# Patient Record
Sex: FEMALE | Race: WHITE | NOT HISPANIC OR LATINO
[De-identification: names, ages, dates, MRNs, and addresses within clinical notes are randomized per-mention and may not be internally consistent; named-entity substitution may affect disease eponyms.]

---

## 2022-11-22 PROBLEM — Z00.00 ENCOUNTER FOR PREVENTIVE HEALTH EXAMINATION: Status: ACTIVE | Noted: 2022-11-22

## 2022-11-23 ENCOUNTER — APPOINTMENT (OUTPATIENT)
Dept: OBGYN | Facility: CLINIC | Age: 32
End: 2022-11-23

## 2022-11-23 ENCOUNTER — NON-APPOINTMENT (OUTPATIENT)
Age: 32
End: 2022-11-23

## 2022-11-23 VITALS
WEIGHT: 138 LBS | BODY MASS INDEX: 25.4 KG/M2 | SYSTOLIC BLOOD PRESSURE: 103 MMHG | HEIGHT: 62 IN | DIASTOLIC BLOOD PRESSURE: 55 MMHG

## 2022-11-23 DIAGNOSIS — Z80.3 FAMILY HISTORY OF MALIGNANT NEOPLASM OF BREAST: ICD-10-CM

## 2022-11-23 DIAGNOSIS — Z01.419 ENCOUNTER FOR GYNECOLOGICAL EXAMINATION (GENERAL) (ROUTINE) W/OUT ABNORMAL FINDINGS: ICD-10-CM

## 2022-11-23 PROCEDURE — 99385 PREV VISIT NEW AGE 18-39: CPT

## 2022-11-23 NOTE — HISTORY OF PRESENT ILLNESS
[Y] : Patient is sexually active [N] : Patient denies prior pregnancies [Menarche Age: ____] : age at menarche was [unfilled] [No] : never pregnant [Currently Active] : currently active [Men] : men [FreeTextEntry1] : Patient presents for initial office visit.  [LMPDate] : 11/06/2022 [MensesFreq] : 28 [MensesLength] : 6-7

## 2022-11-23 NOTE — PLAN
[FreeTextEntry1] : annual: pap and hpv\par known hx of stage 4 endometriosis (surgery in [place] )\par was on ocp but stopped to TTC\par has not had luck TTC for about a year,\par issues discussed and pt sent to [facility] directly

## 2022-11-23 NOTE — PHYSICAL EXAM
[Chaperone Present] : A chaperone was present in the examining room during all aspects of the physical examination [Appropriately responsive] : appropriately responsive [Examination Of The Breasts] : a normal appearance [No Masses] : no breast masses were palpable [Labia Majora] : normal [Normal] : normal [Uterine Adnexae] : normal [FreeTextEntry4] : nodularity and pain felt in post cul de sac and uterosacrals

## 2022-11-29 LAB — HPV HIGH+LOW RISK DNA PNL CVX: NOT DETECTED

## 2022-12-05 LAB — CYTOLOGY CVX/VAG DOC THIN PREP: NORMAL

## 2023-05-22 RX ORDER — PRENATAL SUPPLEMENT WITH DHA 1100; 30; 1.6; 1.8; 15; 2.5; .012; 1; .15; 20; 25; 2; 1000; 200; 20; 29 [IU]/1; MG/1; MG/1; MG/1; MG/1; MG/1; MG/1; MG/1; MG/1; MG/1; MG/1; MG/1; [IU]/1; MG/1; [IU]/1; MG/1
29-1-200 CAPSULE, GELATIN COATED ORAL DAILY
Qty: 90 | Refills: 3 | Status: ACTIVE | COMMUNITY
Start: 2023-05-22 | End: 1900-01-01

## 2023-07-19 ENCOUNTER — APPOINTMENT (OUTPATIENT)
Dept: OBGYN | Facility: CLINIC | Age: 33
End: 2023-07-19
Payer: COMMERCIAL

## 2023-07-19 VITALS
WEIGHT: 135 LBS | OXYGEN SATURATION: 98 % | DIASTOLIC BLOOD PRESSURE: 66 MMHG | SYSTOLIC BLOOD PRESSURE: 102 MMHG | HEART RATE: 74 BPM | BODY MASS INDEX: 24.84 KG/M2 | HEIGHT: 62 IN

## 2023-07-19 DIAGNOSIS — N97.9 FEMALE INFERTILITY, UNSPECIFIED: ICD-10-CM

## 2023-07-19 DIAGNOSIS — Z87.42 PERSONAL HISTORY OF OTHER DISEASES OF THE FEMALE GENITAL TRACT: ICD-10-CM

## 2023-07-19 PROCEDURE — 99213 OFFICE O/P EST LOW 20 MIN: CPT

## 2023-07-21 ENCOUNTER — TRANSCRIPTION ENCOUNTER (OUTPATIENT)
Age: 33
End: 2023-07-21

## 2023-07-21 PROBLEM — Z87.42 HISTORY OF ENDOMETRIOSIS: Status: RESOLVED | Noted: 2022-11-23 | Resolved: 2023-07-21

## 2023-07-21 PROBLEM — N97.9 INFERTILITY, FEMALE, SECONDARY: Status: ACTIVE | Noted: 2023-07-21

## 2023-07-21 NOTE — PLAN
[FreeTextEntry1] : 1. endometriosis and pelvic pain: known stage 4 endometriosis s/p laparoscopy in [place]\par issues related discussed. TTC: I had sent her to [facility] see below\par \par 2. infertility: s/p 1 oocyte retrieval with [facility], only 6 eggs which was a low yield given her age of 32\par thought to be likely secondary to scarring and endometriosis\par \par 3. had delayed menses but then did get about a week late, pregnancy test negative

## 2023-08-28 ENCOUNTER — APPOINTMENT (OUTPATIENT)
Dept: OBGYN | Facility: CLINIC | Age: 33
End: 2023-08-28
Payer: COMMERCIAL

## 2023-08-28 VITALS
BODY MASS INDEX: 25.21 KG/M2 | OXYGEN SATURATION: 98 % | DIASTOLIC BLOOD PRESSURE: 78 MMHG | HEIGHT: 62 IN | SYSTOLIC BLOOD PRESSURE: 122 MMHG | HEART RATE: 99 BPM | WEIGHT: 137 LBS

## 2023-08-28 DIAGNOSIS — Z32.00 ENCOUNTER FOR PREGNANCY TEST, RESULT UNKNOWN: ICD-10-CM

## 2023-08-28 PROCEDURE — 76817 TRANSVAGINAL US OBSTETRIC: CPT

## 2023-08-28 PROCEDURE — 99214 OFFICE O/P EST MOD 30 MIN: CPT

## 2023-08-28 PROCEDURE — 81025 URINE PREGNANCY TEST: CPT

## 2023-08-28 NOTE — PHYSICAL EXAM
[Appropriately responsive] : appropriately responsive [Alert] : alert [No Acute Distress] : no acute distress [No Lymphadenopathy] : no lymphadenopathy [Soft] : soft [Non-tender] : non-tender [Non-distended] : non-distended [No HSM] : No HSM [No Lesions] : no lesions [No Mass] : no mass [Oriented x3] : oriented x3 [Labia Majora] : normal [Labia Minora] : normal [Normal] : normal [Uterine Adnexae] : normal [FreeTextEntry6] : gravid 7 wk

## 2023-08-28 NOTE — PLAN
[FreeTextEntry1] : pregnancy confirmed viable Over 50% of 30 min visit counseling and coordination of care.  *did have mild spotting, AB pos blood type as checked at [facility]  Discussed with patient options regarding genetic tests. Options for invasive tests like amniocentesis, CVS versus NIPT and NT scans discussed at length. Also discussed with patient option for genetic carrier screening tests (both standard 4 tests including Cystic Fibrosis and extended panels with multiple tests) *she did genetic panel at [facility], she was tested positive for famililial med fever,  negative, issues related discussed.  Reading material given.

## 2023-08-28 NOTE — HISTORY OF PRESENT ILLNESS
[FreeTextEntry1] : 34 yo patient presents for pregnancy care. Patient was going to start IVF process and conceived naturally.  IVF([facility]) performed ultrasound on 7/15/23 and 7/22/23 confirmed pregnancy LMP: 7/10/23 SABINE:

## 2023-08-28 NOTE — PROCEDURE
[Transvaginal OB Sonogram] : Transvaginal OB Sonogram [Intrauterine Pregnancy] : intrauterine pregnancy [Yolk Sac] : yolk sac present [Fetal Heart] : fetal heart present [CRL: ___ (mm)] : CRL - [unfilled]Umm [Current GA by Sonogram: ___ (wks)] : Current GA by Sonogram: [unfilled]Uwks [___ day(s)] : [unfilled] days [FreeTextEntry1] : edc today 4/12/24  by lmp 4/18/24 so explained may change to lmp dates

## 2023-09-11 ENCOUNTER — APPOINTMENT (OUTPATIENT)
Dept: OBGYN | Facility: CLINIC | Age: 33
End: 2023-09-11
Payer: COMMERCIAL

## 2023-09-11 VITALS
WEIGHT: 139 LBS | HEART RATE: 107 BPM | DIASTOLIC BLOOD PRESSURE: 70 MMHG | OXYGEN SATURATION: 97 % | BODY MASS INDEX: 25.42 KG/M2 | SYSTOLIC BLOOD PRESSURE: 113 MMHG

## 2023-09-11 DIAGNOSIS — O20.0 THREATENED ABORTION: ICD-10-CM

## 2023-09-11 DIAGNOSIS — N92.6 IRREGULAR MENSTRUATION, UNSPECIFIED: ICD-10-CM

## 2023-09-11 PROCEDURE — 99213 OFFICE O/P EST LOW 20 MIN: CPT

## 2023-09-11 PROCEDURE — 36415 COLL VENOUS BLD VENIPUNCTURE: CPT

## 2023-09-12 LAB
ABO + RH PNL BLD: NORMAL
BLD GP AB SCN SERPL QL: NORMAL
HBV SURFACE AG SER QL: NONREACTIVE
HCV AB SER QL: NONREACTIVE
HCV S/CO RATIO: 0.11 S/CO
HIV1+2 AB SPEC QL IA.RAPID: NONREACTIVE
RUBV IGG FLD-ACNC: 12.3 INDEX
RUBV IGG SER-IMP: POSITIVE
T PALLIDUM AB SER QL IA: NEGATIVE
T4 FREE SERPL-MCNC: 1.1 NG/DL
TSH SERPL-ACNC: 2.15 UIU/ML
VZV AB TITR SER: POSITIVE
VZV IGG SER IF-ACNC: 1085 INDEX

## 2023-09-14 LAB — BACTERIA UR CULT: NORMAL

## 2023-10-09 ENCOUNTER — ASOB RESULT (OUTPATIENT)
Age: 33
End: 2023-10-09

## 2023-10-09 ENCOUNTER — APPOINTMENT (OUTPATIENT)
Dept: ANTEPARTUM | Facility: CLINIC | Age: 33
End: 2023-10-09
Payer: COMMERCIAL

## 2023-10-09 ENCOUNTER — APPOINTMENT (OUTPATIENT)
Dept: OBGYN | Facility: CLINIC | Age: 33
End: 2023-10-09
Payer: COMMERCIAL

## 2023-10-09 VITALS
WEIGHT: 143 LBS | DIASTOLIC BLOOD PRESSURE: 69 MMHG | OXYGEN SATURATION: 99 % | SYSTOLIC BLOOD PRESSURE: 97 MMHG | HEART RATE: 85 BPM | BODY MASS INDEX: 26.16 KG/M2

## 2023-10-09 DIAGNOSIS — N80.9 ENDOMETRIOSIS, UNSPECIFIED: ICD-10-CM

## 2023-10-09 DIAGNOSIS — O09.00 SUPERVISION OF PREGNANCY WITH HISTORY OF INFERTILITY, UNSPECIFIED TRIMESTER: ICD-10-CM

## 2023-10-09 PROCEDURE — 93976 VASCULAR STUDY: CPT

## 2023-10-09 PROCEDURE — 0500F INITIAL PRENATAL CARE VISIT: CPT

## 2023-10-09 PROCEDURE — 81003 URINALYSIS AUTO W/O SCOPE: CPT | Mod: NC,QW

## 2023-10-09 PROCEDURE — 36415 COLL VENOUS BLD VENIPUNCTURE: CPT

## 2023-10-09 PROCEDURE — 76813 OB US NUCHAL MEAS 1 GEST: CPT

## 2023-10-09 RX ORDER — ONDANSETRON 4 MG/1
4 TABLET, ORALLY DISINTEGRATING ORAL
Qty: 20 | Refills: 1 | Status: ACTIVE | COMMUNITY
Start: 2023-10-09 | End: 1900-01-01

## 2023-10-09 RX ORDER — DOXYLAMINE SUCCINATE AND PYRIDOXINE HYDROCHLORIDE 10; 10 MG/1; MG/1
10-10 TABLET, DELAYED RELEASE ORAL
Qty: 90 | Refills: 3 | Status: ACTIVE | COMMUNITY
Start: 2023-10-09 | End: 1900-01-01

## 2023-10-15 PROBLEM — O09.00 SUPERVISION OF PREGNANCY WITH HISTORY OF INFERTILITY: Status: ACTIVE | Noted: 2023-10-15

## 2023-10-15 PROBLEM — N80.9 ENDOMETRIOSIS: Status: ACTIVE | Noted: 2023-07-21

## 2023-11-06 ENCOUNTER — APPOINTMENT (OUTPATIENT)
Dept: OBGYN | Facility: CLINIC | Age: 33
End: 2023-11-06
Payer: COMMERCIAL

## 2023-11-06 ENCOUNTER — NON-APPOINTMENT (OUTPATIENT)
Age: 33
End: 2023-11-06

## 2023-11-06 VITALS
DIASTOLIC BLOOD PRESSURE: 73 MMHG | HEART RATE: 95 BPM | SYSTOLIC BLOOD PRESSURE: 106 MMHG | BODY MASS INDEX: 26.7 KG/M2 | WEIGHT: 146 LBS | OXYGEN SATURATION: 99 %

## 2023-11-06 DIAGNOSIS — Z13.79 ENCOUNTER FOR OTHER SCREENING FOR GENETIC AND CHROMOSOMAL ANOMALIES: ICD-10-CM

## 2023-11-06 PROCEDURE — G0008: CPT

## 2023-11-06 PROCEDURE — 90686 IIV4 VACC NO PRSV 0.5 ML IM: CPT

## 2023-11-06 PROCEDURE — 0502F SUBSEQUENT PRENATAL CARE: CPT

## 2023-11-06 RX ORDER — METOCLOPRAMIDE 5 MG/1
5 TABLET ORAL 4 TIMES DAILY
Qty: 120 | Refills: 0 | Status: ACTIVE | COMMUNITY
Start: 2023-11-06 | End: 1900-01-01

## 2023-11-12 LAB
AFP MOM: 0.87
AFP VALUE: 35.8 NG/ML
ALPHA FETOPROTEIN SERUM COMMENT: NORMAL
ALPHA FETOPROTEIN SERUM INTERPRETATION: NORMAL
ALPHA FETOPROTEIN SERUM RESULTS: NORMAL
ALPHA FETOPROTEIN SERUM TEST RESULTS: NORMAL
GESTATIONAL AGE BASED ON: NORMAL
GESTATIONAL AGE ON COLLECTION DATE: 17.4 WEEKS
INSULIN DEP DIABETES: NO
MATERNAL AGE AT EDD AFP: 31.6 YR
MULTIPLE GESTATION: NO
OSBR RISK 1 IN: NORMAL
RACE: NORMAL
WEIGHT AFP: 146 LBS

## 2023-11-28 ENCOUNTER — ASOB RESULT (OUTPATIENT)
Age: 33
End: 2023-11-28

## 2023-11-28 ENCOUNTER — APPOINTMENT (OUTPATIENT)
Dept: ANTEPARTUM | Facility: CLINIC | Age: 33
End: 2023-11-28
Payer: COMMERCIAL

## 2023-11-28 PROCEDURE — 76817 TRANSVAGINAL US OBSTETRIC: CPT

## 2023-11-28 PROCEDURE — 76811 OB US DETAILED SNGL FETUS: CPT

## 2023-12-01 ENCOUNTER — NON-APPOINTMENT (OUTPATIENT)
Age: 33
End: 2023-12-01

## 2023-12-05 ENCOUNTER — APPOINTMENT (OUTPATIENT)
Dept: OBGYN | Facility: CLINIC | Age: 33
End: 2023-12-05
Payer: COMMERCIAL

## 2023-12-05 VITALS
BODY MASS INDEX: 28.35 KG/M2 | WEIGHT: 155 LBS | SYSTOLIC BLOOD PRESSURE: 106 MMHG | DIASTOLIC BLOOD PRESSURE: 73 MMHG | OXYGEN SATURATION: 100 %

## 2023-12-05 DIAGNOSIS — B00.1 HERPESVIRAL VESICULAR DERMATITIS: ICD-10-CM

## 2023-12-05 PROCEDURE — 0502F SUBSEQUENT PRENATAL CARE: CPT

## 2023-12-05 RX ORDER — VALACYCLOVIR 1 G/1
1 TABLET, FILM COATED ORAL DAILY
Qty: 30 | Refills: 3 | Status: ACTIVE | COMMUNITY
Start: 2023-12-05 | End: 1900-01-01

## 2023-12-15 ENCOUNTER — APPOINTMENT (OUTPATIENT)
Dept: ANTEPARTUM | Facility: CLINIC | Age: 33
End: 2023-12-15
Payer: COMMERCIAL

## 2023-12-15 ENCOUNTER — ASOB RESULT (OUTPATIENT)
Age: 33
End: 2023-12-15

## 2023-12-15 PROCEDURE — 76817 TRANSVAGINAL US OBSTETRIC: CPT

## 2023-12-15 PROCEDURE — 76816 OB US FOLLOW-UP PER FETUS: CPT

## 2023-12-26 ENCOUNTER — APPOINTMENT (OUTPATIENT)
Dept: OBGYN | Facility: CLINIC | Age: 33
End: 2023-12-26
Payer: COMMERCIAL

## 2023-12-26 VITALS
HEART RATE: 109 BPM | DIASTOLIC BLOOD PRESSURE: 59 MMHG | SYSTOLIC BLOOD PRESSURE: 101 MMHG | WEIGHT: 155 LBS | BODY MASS INDEX: 28.35 KG/M2 | OXYGEN SATURATION: 97 %

## 2023-12-26 DIAGNOSIS — Z13.1 ENCOUNTER FOR SCREENING FOR DIABETES MELLITUS: ICD-10-CM

## 2023-12-26 PROCEDURE — 81003 URINALYSIS AUTO W/O SCOPE: CPT | Mod: QW

## 2023-12-26 PROCEDURE — 0502F SUBSEQUENT PRENATAL CARE: CPT

## 2023-12-26 PROCEDURE — 36415 COLL VENOUS BLD VENIPUNCTURE: CPT

## 2024-01-08 LAB
BASOPHILS # BLD AUTO: 0.04 K/UL
BASOPHILS NFR BLD AUTO: 0.4 %
EOSINOPHIL # BLD AUTO: 0.12 K/UL
EOSINOPHIL NFR BLD AUTO: 1.2 %
GLUCOSE 1H P 50 G GLC PO SERPL-MCNC: 124 MG/DL
HCT VFR BLD CALC: 38.1 %
HGB BLD-MCNC: 12.2 G/DL
IMM GRANULOCYTES NFR BLD AUTO: 0.7 %
LYMPHOCYTES # BLD AUTO: 1.35 K/UL
LYMPHOCYTES NFR BLD AUTO: 13.7 %
MAN DIFF?: NORMAL
MCHC RBC-ENTMCNC: 30.3 PG
MCHC RBC-ENTMCNC: 32 GM/DL
MCV RBC AUTO: 94.5 FL
MONOCYTES # BLD AUTO: 0.82 K/UL
MONOCYTES NFR BLD AUTO: 8.3 %
NEUTROPHILS # BLD AUTO: 7.44 K/UL
NEUTROPHILS NFR BLD AUTO: 75.7 %
PLATELET # BLD AUTO: 141 K/UL
RBC # BLD: 4.03 M/UL
RBC # FLD: 14.1 %
T PALLIDUM AB SER QL IA: NEGATIVE
WBC # FLD AUTO: 9.84 K/UL

## 2024-01-24 ENCOUNTER — NON-APPOINTMENT (OUTPATIENT)
Age: 34
End: 2024-01-24

## 2024-01-30 ENCOUNTER — APPOINTMENT (OUTPATIENT)
Dept: OBGYN | Facility: CLINIC | Age: 34
End: 2024-01-30
Payer: COMMERCIAL

## 2024-01-30 VITALS
DIASTOLIC BLOOD PRESSURE: 76 MMHG | BODY MASS INDEX: 29.81 KG/M2 | HEART RATE: 93 BPM | WEIGHT: 163 LBS | OXYGEN SATURATION: 97 % | SYSTOLIC BLOOD PRESSURE: 111 MMHG

## 2024-01-30 DIAGNOSIS — Z23 ENCOUNTER FOR IMMUNIZATION: ICD-10-CM

## 2024-01-30 PROCEDURE — 90715 TDAP VACCINE 7 YRS/> IM: CPT

## 2024-01-30 PROCEDURE — 0502F SUBSEQUENT PRENATAL CARE: CPT

## 2024-01-30 PROCEDURE — 81003 URINALYSIS AUTO W/O SCOPE: CPT | Mod: QW

## 2024-01-30 PROCEDURE — 90471 IMMUNIZATION ADMIN: CPT

## 2024-02-22 ENCOUNTER — ASOB RESULT (OUTPATIENT)
Age: 34
End: 2024-02-22

## 2024-02-22 ENCOUNTER — APPOINTMENT (OUTPATIENT)
Dept: ANTEPARTUM | Facility: CLINIC | Age: 34
End: 2024-02-22
Payer: COMMERCIAL

## 2024-02-22 PROCEDURE — 76819 FETAL BIOPHYS PROFIL W/O NST: CPT

## 2024-02-22 PROCEDURE — 76820 UMBILICAL ARTERY ECHO: CPT | Mod: 59

## 2024-02-22 PROCEDURE — 76816 OB US FOLLOW-UP PER FETUS: CPT

## 2024-02-23 ENCOUNTER — APPOINTMENT (OUTPATIENT)
Dept: OBGYN | Facility: CLINIC | Age: 34
End: 2024-02-23
Payer: COMMERCIAL

## 2024-02-23 VITALS
HEART RATE: 127 BPM | BODY MASS INDEX: 30.55 KG/M2 | DIASTOLIC BLOOD PRESSURE: 70 MMHG | HEIGHT: 62 IN | WEIGHT: 166 LBS | SYSTOLIC BLOOD PRESSURE: 107 MMHG | OXYGEN SATURATION: 98 %

## 2024-02-23 PROCEDURE — 81003 URINALYSIS AUTO W/O SCOPE: CPT | Mod: NC,QW

## 2024-02-23 PROCEDURE — 0502F SUBSEQUENT PRENATAL CARE: CPT

## 2024-03-02 PROBLEM — Z23 ENCOUNTER FOR IMMUNIZATION: Status: ACTIVE | Noted: 2023-11-06

## 2024-03-13 ENCOUNTER — APPOINTMENT (OUTPATIENT)
Dept: OBGYN | Facility: CLINIC | Age: 34
End: 2024-03-13
Payer: COMMERCIAL

## 2024-03-13 VITALS
BODY MASS INDEX: 31.28 KG/M2 | DIASTOLIC BLOOD PRESSURE: 76 MMHG | SYSTOLIC BLOOD PRESSURE: 112 MMHG | HEIGHT: 62 IN | WEIGHT: 170 LBS | HEART RATE: 102 BPM | OXYGEN SATURATION: 100 %

## 2024-03-13 PROCEDURE — 0502F SUBSEQUENT PRENATAL CARE: CPT

## 2024-03-13 PROCEDURE — 81003 URINALYSIS AUTO W/O SCOPE: CPT | Mod: NC,QW

## 2024-03-14 ENCOUNTER — ASOB RESULT (OUTPATIENT)
Age: 34
End: 2024-03-14

## 2024-03-14 ENCOUNTER — APPOINTMENT (OUTPATIENT)
Dept: ANTEPARTUM | Facility: CLINIC | Age: 34
End: 2024-03-14
Payer: COMMERCIAL

## 2024-03-14 PROCEDURE — 76816 OB US FOLLOW-UP PER FETUS: CPT

## 2024-03-14 PROCEDURE — 76819 FETAL BIOPHYS PROFIL W/O NST: CPT

## 2024-03-14 PROCEDURE — 76820 UMBILICAL ARTERY ECHO: CPT | Mod: 59

## 2024-03-22 ENCOUNTER — APPOINTMENT (OUTPATIENT)
Dept: OBGYN | Facility: CLINIC | Age: 34
End: 2024-03-22
Payer: COMMERCIAL

## 2024-03-22 VITALS
OXYGEN SATURATION: 99 % | SYSTOLIC BLOOD PRESSURE: 112 MMHG | WEIGHT: 174 LBS | BODY MASS INDEX: 31.83 KG/M2 | DIASTOLIC BLOOD PRESSURE: 70 MMHG

## 2024-03-22 PROCEDURE — 0502F SUBSEQUENT PRENATAL CARE: CPT

## 2024-03-28 LAB
B-HEM STREP SPEC QL CULT: ABNORMAL
BASOPHILS # BLD AUTO: 0.05 K/UL
BASOPHILS NFR BLD AUTO: 0.4 %
EOSINOPHIL # BLD AUTO: 0.1 K/UL
EOSINOPHIL NFR BLD AUTO: 0.9 %
HBV SURFACE AG SER QL: NONREACTIVE
HCT VFR BLD CALC: 37.3 %
HGB BLD-MCNC: 11.9 G/DL
HIV1+2 AB SPEC QL IA.RAPID: NONREACTIVE
IMM GRANULOCYTES NFR BLD AUTO: 0.7 %
LYMPHOCYTES # BLD AUTO: 1.65 K/UL
LYMPHOCYTES NFR BLD AUTO: 14.1 %
MAN DIFF?: NORMAL
MCHC RBC-ENTMCNC: 28 PG
MCHC RBC-ENTMCNC: 31.9 GM/DL
MCV RBC AUTO: 87.8 FL
MONOCYTES # BLD AUTO: 0.99 K/UL
MONOCYTES NFR BLD AUTO: 8.5 %
NEUTROPHILS # BLD AUTO: 8.83 K/UL
NEUTROPHILS NFR BLD AUTO: 75.4 %
PLATELET # BLD AUTO: 149 K/UL
RBC # BLD: 4.25 M/UL
RBC # FLD: 13.8 %
WBC # FLD AUTO: 11.7 K/UL

## 2024-04-01 ENCOUNTER — NON-APPOINTMENT (OUTPATIENT)
Age: 34
End: 2024-04-01

## 2024-04-02 ENCOUNTER — APPOINTMENT (OUTPATIENT)
Dept: OBGYN | Facility: CLINIC | Age: 34
End: 2024-04-02
Payer: COMMERCIAL

## 2024-04-02 VITALS
BODY MASS INDEX: 32.74 KG/M2 | WEIGHT: 179 LBS | HEART RATE: 109 BPM | OXYGEN SATURATION: 98 % | DIASTOLIC BLOOD PRESSURE: 81 MMHG | SYSTOLIC BLOOD PRESSURE: 121 MMHG

## 2024-04-02 DIAGNOSIS — L29.9 PRURITUS, UNSPECIFIED: ICD-10-CM

## 2024-04-02 PROCEDURE — 0502F SUBSEQUENT PRENATAL CARE: CPT

## 2024-04-06 ENCOUNTER — OUTPATIENT (OUTPATIENT)
Dept: OUTPATIENT SERVICES | Facility: HOSPITAL | Age: 34
LOS: 1 days | Discharge: ANOTHER HOSPITAL | End: 2024-04-06
Payer: COMMERCIAL

## 2024-04-06 VITALS
RESPIRATION RATE: 16 BRPM | DIASTOLIC BLOOD PRESSURE: 53 MMHG | OXYGEN SATURATION: 98 % | SYSTOLIC BLOOD PRESSURE: 115 MMHG | TEMPERATURE: 99 F | HEART RATE: 97 BPM

## 2024-04-06 DIAGNOSIS — O26.899 OTHER SPECIFIED PREGNANCY RELATED CONDITIONS, UNSPECIFIED TRIMESTER: ICD-10-CM

## 2024-04-06 PROCEDURE — 83986 ASSAY PH BODY FLUID NOS: CPT

## 2024-04-06 PROCEDURE — 59025 FETAL NON-STRESS TEST: CPT

## 2024-04-06 PROCEDURE — 99214 OFFICE O/P EST MOD 30 MIN: CPT

## 2024-04-06 NOTE — OB PROVIDER TRIAGE NOTE - HISTORY OF PRESENT ILLNESS
32 yo  at 39w1d presenting for r/o ROM. Patient states that after voiding this evening at 23:30 she began to have continuous discharge that kept coming regardless of wiping. She endorses scant pink tinged discharged as well but no active bleeding, no  CTX. +Normal FM.    Ante: Normal nipt and anatomy, no high bps, passed gct    EFW 2700 ag 36wk, 3500 by [facility] today  GBS positive    GYN Stage IV endometriosis, denies fibroids/abnormal paps/HSV  OBHx     PMH endometriosis  PSH l/s endo excision  w/ endometriomas, nasal surgery   Meds PNV  NKDA

## 2024-04-06 NOTE — OB RN TRIAGE NOTE - NS_CONTACTNUMBEROFSUPPORTPERSON_OBGYN_ALL_OB_FT
LVM for call back. Offered 11-5-21 / 8-9:30am,  will complete paperwork when pt calls back. [phone]

## 2024-04-06 NOTE — OB PROVIDER TRIAGE NOTE - NSOBPROVIDERNOTE_OBGYN_ALL_OB_FT
34 yo  at 39w1d presenting for r/o ROM. Exam negative for ROM, XIAO normal, fetal status reassuring. Matneral BPs normal.    D/c to home. Return precautions discussed.    D/w Dr Palomares

## 2024-04-06 NOTE — OB PROVIDER TRIAGE NOTE - NSHPPHYSICALEXAM_GEN_ALL_CORE
Gen alert nad  abd soft nontender  taus cephalic, posterior placenta, bpp 8/8/ XIAO 9  SSE: Neg pooling, neg nitrazine, neg ferning  SVE FT/Long  EFM baseline 130/mod catarino/+accel/no decel. Reactive and reassuring.   Hettinger q 3 min

## 2024-04-08 ENCOUNTER — RESULT REVIEW (OUTPATIENT)
Age: 34
End: 2024-04-08

## 2024-04-08 DIAGNOSIS — Z03.71 ENCOUNTER FOR SUSPECTED PROBLEM WITH AMNIOTIC CAVITY AND MEMBRANE RULED OUT: ICD-10-CM

## 2024-04-08 DIAGNOSIS — Z3A.39 39 WEEKS GESTATION OF PREGNANCY: ICD-10-CM

## 2024-04-08 DIAGNOSIS — Z87.42 PERSONAL HISTORY OF OTHER DISEASES OF THE FEMALE GENITAL TRACT: ICD-10-CM

## 2024-04-09 ENCOUNTER — APPOINTMENT (OUTPATIENT)
Dept: OBGYN | Facility: CLINIC | Age: 34
End: 2024-04-09

## 2024-04-09 ENCOUNTER — TRANSCRIPTION ENCOUNTER (OUTPATIENT)
Age: 34
End: 2024-04-09

## 2024-04-09 ENCOUNTER — INPATIENT (INPATIENT)
Facility: HOSPITAL | Age: 34
LOS: 4 days | Discharge: HOME | DRG: 833 | End: 2024-04-14
Attending: OBSTETRICS & GYNECOLOGY | Admitting: OBSTETRICS & GYNECOLOGY
Payer: COMMERCIAL

## 2024-04-09 VITALS
RESPIRATION RATE: 18 BRPM | OXYGEN SATURATION: 99 % | HEART RATE: 98 BPM | SYSTOLIC BLOOD PRESSURE: 119 MMHG | TEMPERATURE: 98 F | DIASTOLIC BLOOD PRESSURE: 85 MMHG

## 2024-04-09 DIAGNOSIS — O26.899 OTHER SPECIFIED PREGNANCY RELATED CONDITIONS, UNSPECIFIED TRIMESTER: ICD-10-CM

## 2024-04-09 LAB
BASOPHILS # BLD AUTO: 0.06 K/UL — SIGNIFICANT CHANGE UP (ref 0–0.2)
BASOPHILS NFR BLD AUTO: 0.6 % — SIGNIFICANT CHANGE UP (ref 0–2)
BLD GP AB SCN SERPL QL: NEGATIVE — SIGNIFICANT CHANGE UP
EOSINOPHIL # BLD AUTO: 0.31 K/UL — SIGNIFICANT CHANGE UP (ref 0–0.5)
EOSINOPHIL NFR BLD AUTO: 3 % — SIGNIFICANT CHANGE UP (ref 0–6)
HCT VFR BLD CALC: 37.7 % — SIGNIFICANT CHANGE UP (ref 34.5–45)
HGB BLD-MCNC: 12.5 G/DL — SIGNIFICANT CHANGE UP (ref 11.5–15.5)
IMM GRANULOCYTES NFR BLD AUTO: 0.5 % — SIGNIFICANT CHANGE UP (ref 0–0.9)
LYMPHOCYTES # BLD AUTO: 1.89 K/UL — SIGNIFICANT CHANGE UP (ref 1–3.3)
LYMPHOCYTES # BLD AUTO: 18.3 % — SIGNIFICANT CHANGE UP (ref 13–44)
MCHC RBC-ENTMCNC: 28.5 PG — SIGNIFICANT CHANGE UP (ref 27–34)
MCHC RBC-ENTMCNC: 33.2 GM/DL — SIGNIFICANT CHANGE UP (ref 32–36)
MCV RBC AUTO: 85.9 FL — SIGNIFICANT CHANGE UP (ref 80–100)
MONOCYTES # BLD AUTO: 1.06 K/UL — HIGH (ref 0–0.9)
MONOCYTES NFR BLD AUTO: 10.3 % — SIGNIFICANT CHANGE UP (ref 2–14)
NEUTROPHILS # BLD AUTO: 6.93 K/UL — SIGNIFICANT CHANGE UP (ref 1.8–7.4)
NEUTROPHILS NFR BLD AUTO: 67.3 % — SIGNIFICANT CHANGE UP (ref 43–77)
NRBC # BLD: 0 /100 WBCS — SIGNIFICANT CHANGE UP (ref 0–0)
PLATELET # BLD AUTO: 137 K/UL — LOW (ref 150–400)
RBC # BLD: 4.39 M/UL — SIGNIFICANT CHANGE UP (ref 3.8–5.2)
RBC # FLD: 14.4 % — SIGNIFICANT CHANGE UP (ref 10.3–14.5)
RH IG SCN BLD-IMP: POSITIVE — SIGNIFICANT CHANGE UP
RH IG SCN BLD-IMP: POSITIVE — SIGNIFICANT CHANGE UP
T PALLIDUM AB TITR SER: NEGATIVE — SIGNIFICANT CHANGE UP
WBC # BLD: 10.3 K/UL — SIGNIFICANT CHANGE UP (ref 3.8–10.5)
WBC # FLD AUTO: 10.3 K/UL — SIGNIFICANT CHANGE UP (ref 3.8–10.5)

## 2024-04-09 PROCEDURE — 88307 TISSUE EXAM BY PATHOLOGIST: CPT | Mod: 26

## 2024-04-09 RX ORDER — FENTANYL/BUPIVACAINE/NS/PF 2MCG/ML-.1
250 PLASTIC BAG, INJECTION (ML) INJECTION
Refills: 0 | Status: DISCONTINUED | OUTPATIENT
Start: 2024-04-09 | End: 2024-04-09

## 2024-04-09 RX ORDER — AMPICILLIN TRIHYDRATE 250 MG
1 CAPSULE ORAL EVERY 4 HOURS
Refills: 0 | Status: DISCONTINUED | OUTPATIENT
Start: 2024-04-09 | End: 2024-04-10

## 2024-04-09 RX ORDER — DIPHENHYDRAMINE HCL 50 MG
25 CAPSULE ORAL ONCE
Refills: 0 | Status: COMPLETED | OUTPATIENT
Start: 2024-04-09 | End: 2024-04-09

## 2024-04-09 RX ORDER — OXYTOCIN 10 UNIT/ML
333.33 VIAL (ML) INJECTION
Qty: 20 | Refills: 0 | Status: DISCONTINUED | OUTPATIENT
Start: 2024-04-09 | End: 2024-04-10

## 2024-04-09 RX ORDER — OXYTOCIN 10 UNIT/ML
VIAL (ML) INJECTION
Qty: 30 | Refills: 0 | Status: DISCONTINUED | OUTPATIENT
Start: 2024-04-09 | End: 2024-04-10

## 2024-04-09 RX ORDER — CHLORHEXIDINE GLUCONATE 213 G/1000ML
1 SOLUTION TOPICAL DAILY
Refills: 0 | Status: DISCONTINUED | OUTPATIENT
Start: 2024-04-09 | End: 2024-04-10

## 2024-04-09 RX ORDER — SODIUM CHLORIDE 9 MG/ML
1000 INJECTION, SOLUTION INTRAVENOUS
Refills: 0 | Status: DISCONTINUED | OUTPATIENT
Start: 2024-04-09 | End: 2024-04-10

## 2024-04-09 RX ORDER — ONDANSETRON 8 MG/1
8 TABLET, FILM COATED ORAL ONCE
Refills: 0 | Status: DISCONTINUED | OUTPATIENT
Start: 2024-04-09 | End: 2024-04-14

## 2024-04-09 RX ORDER — AMPICILLIN TRIHYDRATE 250 MG
2 CAPSULE ORAL ONCE
Refills: 0 | Status: COMPLETED | OUTPATIENT
Start: 2024-04-09 | End: 2024-04-09

## 2024-04-09 RX ORDER — CITRIC ACID/SODIUM CITRATE 300-500 MG
15 SOLUTION, ORAL ORAL EVERY 6 HOURS
Refills: 0 | Status: DISCONTINUED | OUTPATIENT
Start: 2024-04-09 | End: 2024-04-10

## 2024-04-09 RX ADMIN — Medication 108 GRAM(S): at 18:30

## 2024-04-09 RX ADMIN — Medication 108 GRAM(S): at 14:42

## 2024-04-09 RX ADMIN — Medication 108 GRAM(S): at 10:42

## 2024-04-09 RX ADMIN — Medication 108 GRAM(S): at 22:05

## 2024-04-09 RX ADMIN — Medication 216 GRAM(S): at 06:30

## 2024-04-09 RX ADMIN — SODIUM CHLORIDE 125 MILLILITER(S): 9 INJECTION, SOLUTION INTRAVENOUS at 07:27

## 2024-04-09 RX ADMIN — Medication 2 MILLIUNIT(S)/MIN: at 07:30

## 2024-04-09 NOTE — OB RN DELIVERY SUMMARY - NSSELHIDDEN_OBGYN_ALL_OB_FT
[NS_DeliveryAttending1_OBGYN_ALL_OB_FT:[id number]],[NS_DeliveryRN_OBGYN_ALL_OB_FT:[id number]]

## 2024-04-09 NOTE — OB PROVIDER H&P - NSLOWPPHRISK_OBGYN_A_OB
No previous uterine incision/Steele Pregnancy/Less than or equal to 4 previous vaginal births/No known bleeding disorder/No history of postpartum hemorrhage/No other PPH risks indicated

## 2024-04-09 NOTE — OB PROVIDER LABOR PROGRESS NOTE - ASSESSMENT
P0 with SROM and labor now undergoing labor augmentation with pitocin, in the latent phase of labor  Will adjust toco to assist with pit titration, may consider IUPC if unable to more reliably monitor contractions  Fetal monitoring reassuring  Discussed labor progress and anticipated course  All questions answered

## 2024-04-09 NOTE — OB PROVIDER LABOR PROGRESS NOTE - ASSESSMENT
- Cat II FHT  - Pt 9cm dilated, will likely being pushing shortly  - Anticipate vaginal delivery  - Dr. Salinas in house

## 2024-04-09 NOTE — OB RN DELIVERY SUMMARY - NS_SEPSISRSKCALC_OBGYN_ALL_OB_FT
EOS calculated successfully. EOS Risk Factor: 0.5/1000 live births ([facility] national incidence); GA=39w5d; Temp=99.7; ROM=48.617; GBS='Positive'; Antibiotics='GBS specific antibiotics > 2 hrs prior to birth'

## 2024-04-09 NOTE — OB RN PATIENT PROFILE - FALL HARM RISK - HARM RISK INTERVENTIONS

## 2024-04-09 NOTE — OB PROVIDER H&P - HISTORY OF PRESENT ILLNESS
SANDY VARGAS is a 32yo  at 39+3 presenting for LOF and painful ctx. She reports gush of clear fluid at 0245 and painful ctx 10/10 requesting epidural ASAP. She denies vaginal bleeding, endorses FM.    Ante: Spontaneous conception. NIPT LR. Anatomy scan wnl. Passed GCT. Denies elevated BP. GBS+. EFW 3000g    OBHx: G1 current   GYNHx: stage IV endometriosis. She denies abnormal pap, STI, fibroids, ovarian cysts  PMHx: denies  meds: PNV  PSH: l/s endometriosis excision , nasal sinus surgery   allergies: NKDA    PE:  T(C): --  HR: --  BP: --  RR: --  SpO2: --  GEN: well-appearing, NAD  PULM: no increased WOB  ABD: soft, nontender, gravid  EXT: mild LE edema  TAUS: vertex  SVE: 50/-3    FHT: baseline 125, moderate variability, +accels, no decels  TOCO: ctx q2-3min  reactive & reassuring     A&P: 32yo  at 39+3 presenting with SROM 0245 in early labor, requesting epidural. Fetal status reassuring.   - Admit to L&D  - Consents signed  - PN reviewed, GBS+, ampicillin ordered   - NPO, IVF  - continuous tocometry, FHT   - epidural no w  - consider augmentation with pitocin     D/W Dr. Sanchez PGY3  D/W Dr. Juany VILLARREAL attending

## 2024-04-09 NOTE — OB PROVIDER LABOR PROGRESS NOTE - ASSESSMENT
Pt seen at bedside for prolonged decel. SVE anterior lip/ 0 station.    FHT: baseline 145, moderate variability, no accels, intermittent variable decels and prolonged 3-min decel with cintia to 100   TOCO: ctx 2-3min   cat II, overall reassuring with moderate variability    - pitocin paused  - pt repositioned to left lateral, will reposition to throne   - epidural in situ   - continue to monitor closely   - anticipate

## 2024-04-09 NOTE — OB RN PATIENT PROFILE - THE METHOD OF FEEDING WHEN THE NEWBORN REQUESTS AND CONTINUING EACH FEEDING SESSION UNTIL THE NEWBORN IS SATISFIED
Vasopressin has been stopped. Will continue to monitor closely.        08/06/18 0100   Vitals   Pulse 81   Heart Rate Source Monitor   Resp 19   /75   BP Location Left upper arm   Art Line   ABP (Arterial line BP) 114/60   ABP mean (Arterial line mean) 81 mmHg   Arterial Line Location Right radial   Art Line Wave Form Appropriate wave forms   Oxygen Therapy   SpO2 100 %   Pulse Oximeter Device Mode Continuous   Pulse Oximeter Device Location Finger   O2 Device Ventilator   FiO2  30 %   End Tidal CO2 26 (%)   Vent Information   PEEP/CPAP 5   Vt Ordered 450 mL   Rate Set 14 bmp   Peak Flow 60 L/min   Pressure Support 0 cmH20   Sensitivity 4 Statement Selected

## 2024-04-09 NOTE — OB PROVIDER LABOR PROGRESS NOTE - ASSESSMENT
Cat I tracing. Irregular contractions on 6mU pitocin.   - Plan to adjust toco  - Continue to titrate pitocin per protocol

## 2024-04-09 NOTE — OB PROVIDER LABOR PROGRESS NOTE - ASSESSMENT
- category 2   - Will reposition patient. Will conitnue to monitor closely.   - Pitocin currently at 4mu.

## 2024-04-09 NOTE — OB PROVIDER LABOR PROGRESS NOTE - ASSESSMENT
Pt seen at bedside for two consecutive prolonged 1-min decel. Pitocin paused. Patient repositioned. VE by Dr. Sanchez noted cervical swelling, 8cm dilated. FHR returned to baseline.     FHT : baseline 145, moderate variability, no accels, one variable decel then two consecutive prolonged late decels (each lasting 1min)   TOCO: ctx q2-3min  cat II     - pitocin paused for now - will restart once reactiive tracing   - reposition patient  - epidural in situ - topoff for analgesia   - continue to monitor closely   Dr. Salinas in-house

## 2024-04-09 NOTE — OB PROVIDER LABOR PROGRESS NOTE - ASSESSMENT
- category 1   - Patient now in active labor at 7cm dilated.   - Pitocin currently at 10mu. Will titrate as tolerated.

## 2024-04-09 NOTE — OB PROVIDER LABOR PROGRESS NOTE - ASSESSMENT
P0 progressing well in labor after presenting with SROM, now in the active phase  Fetal monitoring reassuring

## 2024-04-09 NOTE — OB PROVIDER LABOR PROGRESS NOTE - ASSESSMENT
- Cat I FHT  - Will position in [facility]  - Dr. Salinas in house  - Anticipate vaginal delivery

## 2024-04-09 NOTE — OB RN PATIENT PROFILE - FUNCTIONAL ASSESSMENT - DAILY ACTIVITY PT AGE POP HIDDEN
[facility] [address] [address]  [phone]          September 18, 2023    RE:  Thaddeus Vega                                                                                                                                                       [address] [facility] [place] [address]            To whom it may concern:    Thaddeus Vega is under my professional care for Closed fracture of right forearm, initial encounter He  may return to School with the following: The patient is ABLE to return to school.  No physical activity while in cast until MD follow up and re-evaluation       Sincerely,        Linda Mendez PA-C     Adult

## 2024-04-10 PROCEDURE — 59510 CESAREAN DELIVERY: CPT

## 2024-04-10 DEVICE — ARISTA 3GR: Type: IMPLANTABLE DEVICE | Status: FUNCTIONAL

## 2024-04-10 RX ORDER — AZITHROMYCIN 500 MG/1
500 TABLET, FILM COATED ORAL ONCE
Refills: 0 | Status: COMPLETED | OUTPATIENT
Start: 2024-04-10 | End: 2024-04-10

## 2024-04-10 RX ORDER — DIPHENHYDRAMINE HCL 50 MG
25 CAPSULE ORAL EVERY 6 HOURS
Refills: 0 | Status: DISCONTINUED | OUTPATIENT
Start: 2024-04-10 | End: 2024-04-14

## 2024-04-10 RX ORDER — SODIUM CHLORIDE 9 MG/ML
1000 INJECTION, SOLUTION INTRAVENOUS
Refills: 0 | Status: DISCONTINUED | OUTPATIENT
Start: 2024-04-10 | End: 2024-04-14

## 2024-04-10 RX ORDER — LANOLIN
1 OINTMENT (GRAM) TOPICAL EVERY 6 HOURS
Refills: 0 | Status: DISCONTINUED | OUTPATIENT
Start: 2024-04-10 | End: 2024-04-14

## 2024-04-10 RX ORDER — TETANUS TOXOID, REDUCED DIPHTHERIA TOXOID AND ACELLULAR PERTUSSIS VACCINE, ADSORBED 5; 2.5; 8; 8; 2.5 [IU]/.5ML; [IU]/.5ML; UG/.5ML; UG/.5ML; UG/.5ML
0.5 SUSPENSION INTRAMUSCULAR ONCE
Refills: 0 | Status: DISCONTINUED | OUTPATIENT
Start: 2024-04-10 | End: 2024-04-14

## 2024-04-10 RX ORDER — ACETAMINOPHEN 500 MG
975 TABLET ORAL
Refills: 0 | Status: DISCONTINUED | OUTPATIENT
Start: 2024-04-10 | End: 2024-04-14

## 2024-04-10 RX ORDER — KETOROLAC TROMETHAMINE 30 MG/ML
30 SYRINGE (ML) INJECTION EVERY 6 HOURS
Refills: 0 | Status: DISCONTINUED | OUTPATIENT
Start: 2024-04-10 | End: 2024-04-11

## 2024-04-10 RX ORDER — TRANEXAMIC ACID 100 MG/ML
1000 INJECTION, SOLUTION INTRAVENOUS ONCE
Refills: 0 | Status: COMPLETED | OUTPATIENT
Start: 2024-04-10 | End: 2024-04-10

## 2024-04-10 RX ORDER — IBUPROFEN 200 MG
600 TABLET ORAL EVERY 6 HOURS
Refills: 0 | Status: COMPLETED | OUTPATIENT
Start: 2024-04-10 | End: 2025-03-09

## 2024-04-10 RX ORDER — SIMETHICONE 80 MG/1
80 TABLET, CHEWABLE ORAL EVERY 4 HOURS
Refills: 0 | Status: DISCONTINUED | OUTPATIENT
Start: 2024-04-10 | End: 2024-04-14

## 2024-04-10 RX ORDER — OXYCODONE HYDROCHLORIDE 5 MG/1
5 TABLET ORAL ONCE
Refills: 0 | Status: DISCONTINUED | OUTPATIENT
Start: 2024-04-10 | End: 2024-04-14

## 2024-04-10 RX ORDER — OXYCODONE HYDROCHLORIDE 5 MG/1
5 TABLET ORAL
Refills: 0 | Status: COMPLETED | OUTPATIENT
Start: 2024-04-10 | End: 2024-04-17

## 2024-04-10 RX ORDER — OXYTOCIN 10 UNIT/ML
333.33 VIAL (ML) INJECTION
Qty: 20 | Refills: 0 | Status: DISCONTINUED | OUTPATIENT
Start: 2024-04-10 | End: 2024-04-14

## 2024-04-10 RX ORDER — ENOXAPARIN SODIUM 100 MG/ML
40 INJECTION SUBCUTANEOUS EVERY 24 HOURS
Refills: 0 | Status: DISCONTINUED | OUTPATIENT
Start: 2024-04-10 | End: 2024-04-14

## 2024-04-10 RX ORDER — METOCLOPRAMIDE HCL 10 MG
10 TABLET ORAL ONCE
Refills: 0 | Status: COMPLETED | OUTPATIENT
Start: 2024-04-10 | End: 2024-04-10

## 2024-04-10 RX ORDER — MAGNESIUM HYDROXIDE 400 MG/1
30 TABLET, CHEWABLE ORAL
Refills: 0 | Status: DISCONTINUED | OUTPATIENT
Start: 2024-04-10 | End: 2024-04-14

## 2024-04-10 RX ADMIN — Medication 10 MILLIGRAM(S): at 03:34

## 2024-04-10 RX ADMIN — Medication 975 MILLIGRAM(S): at 20:56

## 2024-04-10 RX ADMIN — AZITHROMYCIN 255 MILLIGRAM(S): 500 TABLET, FILM COATED ORAL at 03:36

## 2024-04-10 RX ADMIN — ENOXAPARIN SODIUM 40 MILLIGRAM(S): 100 INJECTION SUBCUTANEOUS at 17:50

## 2024-04-10 RX ADMIN — Medication 0.2 MILLIGRAM(S): at 03:27

## 2024-04-10 RX ADMIN — Medication 30 MILLIGRAM(S): at 12:52

## 2024-04-10 RX ADMIN — TRANEXAMIC ACID 220 MILLIGRAM(S): 100 INJECTION, SOLUTION INTRAVENOUS at 03:28

## 2024-04-10 RX ADMIN — Medication 975 MILLIGRAM(S): at 09:05

## 2024-04-10 RX ADMIN — Medication 30 MILLIGRAM(S): at 05:00

## 2024-04-10 RX ADMIN — Medication 975 MILLIGRAM(S): at 14:59

## 2024-04-10 RX ADMIN — Medication 25 MILLIGRAM(S): at 00:20

## 2024-04-10 RX ADMIN — Medication 975 MILLIGRAM(S): at 22:05

## 2024-04-10 RX ADMIN — Medication 30 MILLIGRAM(S): at 17:51

## 2024-04-10 NOTE — OB PROVIDER DELIVERY SUMMARY - NSPROVIDERDELIVERYNOTE_OBGYN_ALL_OB_FT
Low transverse  for nonreassuring heart tracing while pushing. Fetus in cephalic presentation. Fluid clear. Nuchal x 1 reduced. Hand from below used to assist with delivery. Uterus closed in two layers in an imbricating fashion with vicryl. Rectus closed with vicryl, with one vertical mattress suture. Fascia closed with vicryl. Subcutaneous closed in one layer with plain gut. Skin closed with monocryl. EBL 1000. IVF 1200 . . Low transverse  for nonreassuring heart tracing while pushing. Fetus in cephalic presentation. Fluid clear. Nuchal x 1 reduced. Hand from below used to assist with delivery. Uterus closed in two layers in an imbricating fashion with vicryl. Thai placed over hysterotomy. Rectus closed with vicryl, with one vertical mattress suture. Fascia closed with vicryl. Subcutaneous closed in one layer with plain gut. Skin closed with monocryl. EBL 1000. IVF 1200 . .

## 2024-04-10 NOTE — OB PROVIDER LABOR PROGRESS NOTE - ASSESSMENT
Pt seen at bedside with Dr. Salinas. Per Dr. Sanchez's recent VE patient making cervical change from 8cm to now anterior lip, possibly reducible with pushing/ctx. Patient performed with several practice pushes with Dr. Salinas at bedside, cervical lip not reducing. Instructed patient that we will stop pushing until more cervical change is made. Given cervical change from prior exam will continue with labor augmentation at this time and reexamine in ~1hr.    Prior to pushing FHT: baseline 135, moderate variability, +accels, no decels  TOCO: ctx q2-4min  cat I     With pushing variable and late decels with ctx/pushes, cat II     - pitocin at 8mu, continue to uptitrate to increase changes of cervical change and   - epidural in situ   - continue to monitor closely   - hopeful for   Dr. Salinas in-house  Pt seen at bedside with Dr. Salinas. Per Dr. Sanchez's recent VE patient making cervical change from 8cm to now anterior lip, thought to be possibly reducible with pushing/ctx. Patient performed several practice pushes with Dr. Salinas at bedside, cervical lip not reducing. Instructed patient that we will stop pushing until more cervical change is made. Given cervical change from prior exam will continue with labor augmentation at this time and reexamine in ~1hr.    Prior to pushing FHT: baseline 135, moderate variability, +accels, no decels  TOCO: ctx q2-4min  cat I     With pushing variable and late decels with ctx/pushes, cat II     - pitocin at 8mu, continue to uptitrate to increase changes of cervical change and   - epidural in situ   - continue to monitor closely   - hopeful for   Dr. Salinas in-house  Pt seen at bedside with Dr. Salinas. Per Dr. Sanchez's recent VE patient making cervical change from 9cm to now anterior lip, thought to be possibly reducible with pushing/ctx. Patient performed several practice pushes with Dr. Salinas at bedside, cervical lip not reducing. Instructed patient that we will stop pushing until more cervical change is made. Given cervical change from prior exam will continue with labor augmentation at this time and reexamine in ~1hr.    Prior to pushing FHT: baseline 135, moderate variability, +accels, no decels  TOCO: ctx q2-4min  cat I     With pushing variable and late decels with ctx/pushes, cat II     - pitocin at 8mu, continue to uptitrate to increase changes of cervical change and   - epidural in situ   - continue to monitor closely   - hopeful for   Dr. Salinas in-house

## 2024-04-10 NOTE — OB NEONATOLOGY/PEDIATRICIAN DELIVERY SUMMARY - NSPEDSNEONOTESA_OBGYN_ALL_OB_FT
Primary C/S called for category 2 tracing at 39.4 wks GA. Maternal labs negative except for + GBS; mother was adequately treated with Ampicillin. ROM x 24 hrs. Nuchal cord x 1 noted at delivery. Infant cried after bulb suction and stimulation. APGARS9/9. EOS= <0.5

## 2024-04-10 NOTE — LACTATION INITIAL EVALUATION - LACTATION INTERVENTIONS
initiate/review safe skin-to-skin/initiate/review techniques for position and latch/post discharge community resources provided/review techniques to increase milk supply/review techniques to manage sore nipples/engorgement/reviewed components of an effective feeding and at least 8 effective feedings per day required/reviewed importance of monitoring infant diapers, the breastfeeding log, and minimum output each day/reviewed feeding on demand/by cue at least 8 times a day/reviewed indications of inadequate milk transfer that would require supplementation

## 2024-04-10 NOTE — OB PROVIDER DELIVERY SUMMARY - NSSELHIDDEN_OBGYN_ALL_OB_FT
[NS_DeliveryAttending1_OBGYN_ALL_OB_FT:[id number]],[NS_DeliveryRN_OBGYN_ALL_OB_FT:[id number]],[NS_DeliveryAssist1_OBGYN_ALL_OB_FT:[id number]]

## 2024-04-10 NOTE — OB PROVIDER LABOR PROGRESS NOTE - NS_OBIHICONTRACTIONPATTERNDETAILS_OBGYN_ALL_OB_FT
Irregular contractions on 6mU pitocin
morgan 3 in 10 minutes.
q3-5min <200MVU
Irregular, not reliably picking up
morgan 4 in 10 minutes.
morgan 4 in 10 minutes.
q2 min
4 contractions in 10 min
morgan 4 in 10 minutes.
morgan 2 in 10 minuts.
morgan 3 in 10 minutes
IUPC: >200MVU  Pitocin 10mU

## 2024-04-10 NOTE — OB PROVIDER LABOR PROGRESS NOTE - NS_OBIHIFHRDETAILS_OBGYN_ALL_OB_FT
baseline 120, moderate variability, +accels, intermittent late decels.
baseline 150, moderate variability, +accels, no decels.
FHT reviewed. Cat I tracing. Baseline 130bpm. Moderate variability. +accels, no decels noted.
baseline 130, moderate variability, +accels, no decels.
baseline 135, moderate variability, +accels, nod ecels.
baseline 140, moderate variability, +accels, no decels.
130bpm baseline, moderate variability, accelerations, 2 late decelerations both 1 minute long with cintia 100 with spontaneous return
Category 1
145bpm baseline, moderate variability, accelerations present, 2 late declerations at time of note writing
155bpm baseline, moderate variability, accelerations present, no decelerations
Baseline 140, moderate variability, + accels, occasional variable decels
baseline 120, moderate variability, +accels, no decels

## 2024-04-10 NOTE — OB PROVIDER LABOR PROGRESS NOTE - ASSESSMENT
Patient seen at bedside to discuss lack of cervical change and abnormal labor progression. On last VE by Dr. Sanchez cervix noted to be swollen and dilation 9cm from 9.5cm. Discussed with patient that in active phase of labor cervical change is usually more rapid, particular once 9+cm. Patient has been the same exam for approx 4hrs with inadequate contractions. Patient counseled that her labor progress at this point has become protracted and we will try to continue with augmentation e.g. increasing pitocin until adequate as tolerated by fetus, trying IV benadryl for cervical swelling, and repositioning with peanut ball to increase chances of progression and vaginal delivery. Expressed that we are hopeful for a vaginal delivery but concerned about the possibility of arrest of labor. In the case of no cervical change by ~0100 would likely consider this arrest of dilation and recommend C/S delivery. Patient and partner in understanding and agreement with plan. All questions answered. Patient seen at bedside to discuss lack of cervical change and abnormal labor progression. On last VE by Dr. Sanchez cervix noted to be swollen and dilation 9cm from 9.5cm. Discussed with patient that in active phase of labor cervical change is usually more rapid, particular once 9+cm. Patient has been the same exam for approx 4hrs with inadequate contractions. Patient counseled that her labor progress at this point has become protracted and we will try to continue with augmentation e.g. increasing pitocin until adequate as tolerated by fetus, trying IV benadryl for cervical swelling, and repositioning with peanut ball to increase chances of progression and vaginal delivery. Expressed that we are hopeful for a vaginal delivery but concerned about the possibility of arrest of labor. In the case of no cervical change by ~0100 would likely consider this arrest of dilation and recommend C/S delivery. Patient and partner in understanding and agreement with plan. All questions answered.    FHT: baseline 145, moderate variability, no accels, no decels  TOCO: ctx 2 in 10min, not adequate  cat I

## 2024-04-10 NOTE — OB RN INTRAOPERATIVE NOTE - NS_ANESTHESIASTART_OBGYN_ALL_OB_DT
Mom called regarding referral for Psychiatrist from Dr Du Marie, unable to get appointment scheduled, mom frustrated at this point asking for assistance regarding this issue 10-Apr-2024 03:09

## 2024-04-10 NOTE — OB PROVIDER LABOR PROGRESS NOTE - ASSESSMENT
Attending note late entry 2/2 patient care:    While pushing with patient repeat late decelerations with pushing with delayed response to baseline. After resusitation and recovery, without decelerations pushing was attempted again with prolonged decelerations, OB stat was called. Patient was verbally consented about risks including: infection, bleeding, blood transfusion, bowel/bladder injury/repair, possible  hysterectomy. Verbally agreed and taken to OR. Fetal heart beat in 150s, therefore, stat was deferred and patient was prepped in usual sterile fashion.

## 2024-04-10 NOTE — LACTATION INITIAL EVALUATION - NS LACT CON REASON FOR REQ
39.5 wk gestation baby, about 7 hrs old at this time. Placed the baby STS with the mother while I provided breastfeeding education and explained normal  behaviour and the milk production feedback system. Assisted with positioning in a cross cradle hold and taught latch strategies. Baby was able to latch deeply and is feeding well, rhythmically sucking between short pauses of rest. Mother to continue with STS when possible, room-in, and feed as per cues at least 8-12x/ day. To f/u as needed.

## 2024-04-10 NOTE — OB RN INTRAOPERATIVE NOTE - NSSELHIDDEN_OBGYN_ALL_OB_FT
[NS_DeliveryAttending1_OBGYN_ALL_OB_FT:[id number]],[NS_DeliveryRN_OBGYN_ALL_OB_FT:[id number]] [NS_DeliveryAttending1_OBGYN_ALL_OB_FT:[id number]],[NS_DeliveryRN_OBGYN_ALL_OB_FT:[id number]],[NS_DeliveryAssist1_OBGYN_ALL_OB_FT:[id number]]

## 2024-04-10 NOTE — OB PROVIDER LABOR PROGRESS NOTE - NSVAGINALEXAM_OBGYN_ALL_OB_DT
09-Apr-2024 12:55
09-Apr-2024 18:58
09-Apr-2024 20:38
09-Apr-2024 17:50
09-Apr-2024 09:46
09-Apr-2024 16:40
10-Apr-2024 01:47

## 2024-04-10 NOTE — OB PROVIDER LABOR PROGRESS NOTE - NS_SUBJECTIVE/OBJECTIVE_OBGYN_ALL_OB_FT
Assuming care of pt for night team. FHT reviewed. Baseline 140, mod variability, +accels, nonrecurrent variable decelerations
Note entry delayed 2/2 pt care. Pt seen at bedside at 0245 for repeat cervical exam, pt w/ anterior lip that can be easily reduced. Pt started pushing with contractions and had a prolonged late deceleration. Pt was repositioned with return to baseline. Pitocin paused. Dr. Salinas at bedside. Discussion w/ pt and partner at bedside regarding decelerations with pushing and that if the baby is unable to tolerate pushing will need to proceed with primary  section. Decision made to observe for a few contractions before pushing again. When pushing baby again had late decelerations and the decision was made to proceed with primary  section for arrest of descent and NRFHT. At this time pt had another deceleration without return to baseline. The decision was made to call OB STAT and  RR overhead ot expedite moving to OR. Pt moved urgently to OR where FH was found to be returned to baseline of 150s. Given NRFHT discussed proceeding with primary  section. Pt in agreement with plan and verbal consent obtained. See operative note for additional details.
Pt seen at bedside. Reports rectal pressure with contractions. FHT reviewed. Baseline 150, mod variability, -accels, rare variable decelerations. Currently cat I FHT. SVE 9/90/0
Incoming attending note:  Patient seen at bedside with nurse and partner. Patient states she is appreciating contractions but tolerable at this time. Patient endorsed increase rectal pressure since last exam. Patient also felt warm to touch, temp 100.1, discussed ROM >12hours and risk of intrauterine infection. If temperature or changes in fetal status might need to add additional antibiotics. All questions were asked and answered.
Patient reports feeling comfortable with the epidural
Assumed care of patient 8am  Delayed entry 2/2 patient care  History and hospital course reviewed  Patient seen and evaluated    She reports pain is well controlled with the epidural. Denies concerns.
Patient seen at bedside for examination.
Patient seen at bedside. Cervical check exams have been done by same examiner, and found to have change from 9cm to anterior lip. Multiple attempts to push and reduce lip were done on contractions but unable to be reduced at this time. Due to change of cervix will continue increasing pitocin 2/2 inadequate contractions and re-examine in 1 hour. Caput on fetal head noted. All questions were asked and answered.
Patient seen at bedside for examination.
Patient appreciating increased rectal pressure.

## 2024-04-11 LAB
BASOPHILS # BLD AUTO: 0.04 K/UL — SIGNIFICANT CHANGE UP (ref 0–0.2)
BASOPHILS NFR BLD AUTO: 0.3 % — SIGNIFICANT CHANGE UP (ref 0–2)
EOSINOPHIL # BLD AUTO: 0.27 K/UL — SIGNIFICANT CHANGE UP (ref 0–0.5)
EOSINOPHIL NFR BLD AUTO: 1.9 % — SIGNIFICANT CHANGE UP (ref 0–6)
HCT VFR BLD CALC: 29.4 % — LOW (ref 34.5–45)
HGB BLD-MCNC: 9.4 G/DL — LOW (ref 11.5–15.5)
IMM GRANULOCYTES NFR BLD AUTO: 0.7 % — SIGNIFICANT CHANGE UP (ref 0–0.9)
LYMPHOCYTES # BLD AUTO: 1.26 K/UL — SIGNIFICANT CHANGE UP (ref 1–3.3)
LYMPHOCYTES # BLD AUTO: 8.9 % — LOW (ref 13–44)
MCHC RBC-ENTMCNC: 28.5 PG — SIGNIFICANT CHANGE UP (ref 27–34)
MCHC RBC-ENTMCNC: 32 GM/DL — SIGNIFICANT CHANGE UP (ref 32–36)
MCV RBC AUTO: 89.1 FL — SIGNIFICANT CHANGE UP (ref 80–100)
MONOCYTES # BLD AUTO: 1.19 K/UL — HIGH (ref 0–0.9)
MONOCYTES NFR BLD AUTO: 8.4 % — SIGNIFICANT CHANGE UP (ref 2–14)
NEUTROPHILS # BLD AUTO: 11.34 K/UL — HIGH (ref 1.8–7.4)
NEUTROPHILS NFR BLD AUTO: 79.8 % — HIGH (ref 43–77)
NRBC # BLD: 0 /100 WBCS — SIGNIFICANT CHANGE UP (ref 0–0)
PLATELET # BLD AUTO: 101 K/UL — LOW (ref 150–400)
RBC # BLD: 3.3 M/UL — LOW (ref 3.8–5.2)
RBC # FLD: 14.7 % — HIGH (ref 10.3–14.5)
WBC # BLD: 14.2 K/UL — HIGH (ref 3.8–10.5)
WBC # FLD AUTO: 14.2 K/UL — HIGH (ref 3.8–10.5)

## 2024-04-11 RX ORDER — IBUPROFEN 200 MG
600 TABLET ORAL EVERY 6 HOURS
Refills: 0 | Status: DISCONTINUED | OUTPATIENT
Start: 2024-04-11 | End: 2024-04-14

## 2024-04-11 RX ORDER — OXYCODONE HYDROCHLORIDE 5 MG/1
5 TABLET ORAL
Refills: 0 | Status: DISCONTINUED | OUTPATIENT
Start: 2024-04-11 | End: 2024-04-14

## 2024-04-11 RX ADMIN — Medication 975 MILLIGRAM(S): at 02:06

## 2024-04-11 RX ADMIN — SIMETHICONE 80 MILLIGRAM(S): 80 TABLET, CHEWABLE ORAL at 15:38

## 2024-04-11 RX ADMIN — Medication 600 MILLIGRAM(S): at 18:48

## 2024-04-11 RX ADMIN — Medication 600 MILLIGRAM(S): at 06:35

## 2024-04-11 RX ADMIN — Medication 600 MILLIGRAM(S): at 12:09

## 2024-04-11 RX ADMIN — Medication 975 MILLIGRAM(S): at 09:11

## 2024-04-11 RX ADMIN — Medication 1 TABLET(S): at 12:08

## 2024-04-11 RX ADMIN — Medication 975 MILLIGRAM(S): at 15:37

## 2024-04-11 RX ADMIN — Medication 30 MILLIGRAM(S): at 00:00

## 2024-04-11 RX ADMIN — Medication 600 MILLIGRAM(S): at 23:51

## 2024-04-11 RX ADMIN — Medication 975 MILLIGRAM(S): at 22:40

## 2024-04-11 RX ADMIN — Medication 975 MILLIGRAM(S): at 16:07

## 2024-04-11 RX ADMIN — Medication 600 MILLIGRAM(S): at 12:39

## 2024-04-11 RX ADMIN — SIMETHICONE 80 MILLIGRAM(S): 80 TABLET, CHEWABLE ORAL at 21:44

## 2024-04-11 RX ADMIN — OXYCODONE HYDROCHLORIDE 5 MILLIGRAM(S): 5 TABLET ORAL at 15:38

## 2024-04-11 RX ADMIN — Medication 600 MILLIGRAM(S): at 18:18

## 2024-04-11 RX ADMIN — OXYCODONE HYDROCHLORIDE 5 MILLIGRAM(S): 5 TABLET ORAL at 16:08

## 2024-04-11 RX ADMIN — Medication 975 MILLIGRAM(S): at 21:44

## 2024-04-11 RX ADMIN — Medication 975 MILLIGRAM(S): at 09:41

## 2024-04-11 RX ADMIN — ENOXAPARIN SODIUM 40 MILLIGRAM(S): 100 INJECTION SUBCUTANEOUS at 15:37

## 2024-04-11 NOTE — PROGRESS NOTE ADULT - SUBJECTIVE AND OBJECTIVE BOX
Patient evaluated at bedside this morning, resting comfortable in bed.   She reports pain is well controlled with oral pain medications.   She denies headache, dizziness, chest pain, shortness of breath, vomiting or heavy vaginal bleeding.  She has been ambulating without assistance, voiding spontaneously, passing gas, tolerating regular diet.     Physical Exam:  Vital Signs Last 24 Hrs  T(C): 36.3 (11 Apr 2024 02:00), Max: 36.6 (10 Apr 2024 17:50)  T(F): 97.4 (11 Apr 2024 02:00), Max: 97.9 (10 Apr 2024 22:00)  HR: 77 (11 Apr 2024 02:00) (59 - 83)  BP: 99/64 (11 Apr 2024 02:00) (94/55 - 116/71)  BP(mean): --  RR: 16 (11 Apr 2024 02:00) (16 - 18)  SpO2: 97% (11 Apr 2024 02:00) (97% - 98%)    Parameters below as of 10 Apr 2024 17:50  Patient On (Oxygen Delivery Method): room air        GA: NAD, A+0 x 3  Pulm: no increased WOB  Abd: soft, nontender, mildly distended, no rebound or guarding, incision clean, dry and intact, uterus firm at midline, 1 fb below umbilicus  Extremities: mild lower extremity edema, no calf tenderness

## 2024-04-12 ENCOUNTER — TRANSCRIPTION ENCOUNTER (OUTPATIENT)
Age: 34
End: 2024-04-12

## 2024-04-12 RX ORDER — IBUPROFEN 200 MG
1 TABLET ORAL
Qty: 0 | Refills: 0 | DISCHARGE
Start: 2024-04-12

## 2024-04-12 RX ORDER — ACETAMINOPHEN 500 MG
3 TABLET ORAL
Qty: 0 | Refills: 0 | DISCHARGE
Start: 2024-04-12

## 2024-04-12 RX ADMIN — Medication 600 MILLIGRAM(S): at 06:12

## 2024-04-12 RX ADMIN — Medication 600 MILLIGRAM(S): at 19:11

## 2024-04-12 RX ADMIN — Medication 975 MILLIGRAM(S): at 08:45

## 2024-04-12 RX ADMIN — Medication 1 APPLICATION(S): at 06:12

## 2024-04-12 RX ADMIN — Medication 600 MILLIGRAM(S): at 23:57

## 2024-04-12 RX ADMIN — Medication 975 MILLIGRAM(S): at 15:48

## 2024-04-12 RX ADMIN — Medication 975 MILLIGRAM(S): at 22:17

## 2024-04-12 RX ADMIN — Medication 600 MILLIGRAM(S): at 00:50

## 2024-04-12 RX ADMIN — Medication 600 MILLIGRAM(S): at 12:00

## 2024-04-12 RX ADMIN — ENOXAPARIN SODIUM 40 MILLIGRAM(S): 100 INJECTION SUBCUTANEOUS at 16:20

## 2024-04-12 RX ADMIN — Medication 975 MILLIGRAM(S): at 23:10

## 2024-04-12 RX ADMIN — Medication 975 MILLIGRAM(S): at 15:18

## 2024-04-12 RX ADMIN — SIMETHICONE 80 MILLIGRAM(S): 80 TABLET, CHEWABLE ORAL at 06:12

## 2024-04-12 RX ADMIN — Medication 600 MILLIGRAM(S): at 18:11

## 2024-04-12 RX ADMIN — Medication 600 MILLIGRAM(S): at 12:30

## 2024-04-12 RX ADMIN — Medication 975 MILLIGRAM(S): at 09:15

## 2024-04-12 RX ADMIN — Medication 1 TABLET(S): at 16:36

## 2024-04-12 RX ADMIN — Medication 600 MILLIGRAM(S): at 07:00

## 2024-04-12 NOTE — DISCHARGE NOTE OB - PATIENT PORTAL LINK FT
You can access the FollowMyHealth Patient Portal offered by [facility] by registering at the following website: [email or web address]. By joining [employer]’s FollowMyHealth portal, you will also be able to view your health information using other applications (apps) compatible with our system.

## 2024-04-12 NOTE — DISCHARGE NOTE OB - CARE PROVIDER_API CALL
Farida Harrington  Obstetrics and Gynecology  [address]  [place] [address]  Phone: [phone]  Fax: [phone]  Follow Up Time:

## 2024-04-12 NOTE — PROGRESS NOTE ADULT - SUBJECTIVE AND OBJECTIVE BOX
Patient evaluated at bedside this morning, resting comfortable in bed.   She reports pain is well controlled with oral pain medications.   She denies headache, dizziness, chest pain, shortness of breath, vomiting or heavy vaginal bleeding.  She has been ambulating without assistance, voiding spontaneously, passing gas, tolerating regular diet.     Physical Exam:  Vital Signs Last 24 Hrs  T(C): 36.7 (12 Apr 2024 02:00), Max: 36.8 (11 Apr 2024 18:00)  T(F): 98 (12 Apr 2024 02:00), Max: 98.3 (11 Apr 2024 18:00)  HR: 82 (12 Apr 2024 02:00) (74 - 82)  BP: 95/59 (12 Apr 2024 02:00) (95/59 - 108/60)  BP(mean): --  RR: 18 (12 Apr 2024 02:00) (16 - 18)  SpO2: 96% (12 Apr 2024 02:00) (95% - 98%)    Parameters below as of 11 Apr 2024 14:00  Patient On (Oxygen Delivery Method): room air        GA: NAD, A+0 x 3  Pulm: no increased WOB  Abd: soft, nontender, mildly distended, no rebound or guarding, incision clean, dry and intact, uterus firm at midline, 2 fb below umbilicus  Extremities: mild lower extremity edema, no calf tenderness                             9.4    14.20 )-----------( 101      ( 11 Apr 2024 05:30 )             29.4                Patient evaluated at bedside this morning, resting comfortable in bed.   She reports pain is well controlled with oral pain medications.   She denies headache, dizziness, chest pain, shortness of breath, vomiting or heavy vaginal bleeding.  She has been ambulating without assistance, voiding spontaneously, passing gas, tolerating regular diet.     Physical Exam:  Vital Signs Last 24 Hrs  T(C): 36.7 (12 Apr 2024 02:00), Max: 36.8 (11 Apr 2024 18:00)  T(F): 98 (12 Apr 2024 02:00), Max: 98.3 (11 Apr 2024 18:00)  HR: 82 (12 Apr 2024 02:00) (74 - 82)  BP: 95/59 (12 Apr 2024 02:00) (95/59 - 108/60)  BP(mean): --  RR: 18 (12 Apr 2024 02:00) (16 - 18)  SpO2: 96% (12 Apr 2024 02:00) (95% - 98%)    Parameters below as of 11 Apr 2024 14:00  Patient On (Oxygen Delivery Method): room air        GA: NAD, A+0 x 3  Pulm: no increased WOB  Abd: soft, nontender, mildly distended, no rebound or guarding, incision clean, dry and intact, uterus firm at midline, 2 fb below umbilicus  Extremities: moderate nonpitting lower extremity edema, no calf tenderness                             9.4    14.20 )-----------( 101      ( 11 Apr 2024 05:30 )             29.4

## 2024-04-12 NOTE — DISCHARGE NOTE OB - CARE PLAN
1 Principal Discharge DX:	Single delivery by  section  Assessment and plan of treatment:	 delivery, meeting all postpartum milestones.  Please follow-up with your OB doctor within 2 weeks.  You can resume a regular diet at home and may continue your prenatal vitamins as directed.  Please place nothing in the vagina for 6 weeks (no tampons, sex, douching, tub baths, swimming pools, etc).  If you have severe headaches and/or vision changes, heavy bleeding, or chest pain, please call your provider or go to the nearest Emergency Department.  Please call your OB with any signs of symptoms of infection including fever > 100.4 degrees, severe pain, redness/warmth/drainage from the incision,  malodorous vaginal discharge or heavy bleeding requiring more than 1-2 pads/hour. Please refrain from heavy lifting. Avoid soaking/bathing, you may shower normally. Make sure your incision stays dry after shower, avoid creams and ointments.  You can take Motrin 600mg orally every 6 hours  and tyelnol 650mg every 6-8 hours for pain as needed. Take oxycodone 5mg every 6 hours for severe pain as needed.  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

## 2024-04-12 NOTE — DISCHARGE NOTE OB - HOSPITAL COURSE
Patient underwent an uncomplicated  delivery for arrest of dilation/NRFHT after presenting with SROM. Uncomplicated postpartum course. On day of discharge, patient is meeting all post op milestones and all vitals are stable.

## 2024-04-12 NOTE — DISCHARGE NOTE OB - NS MD DC FALL RISK RISK
For information on Fall & Injury Prevention, visit: [email or web address] OR  [email or web address] OR  https://www.cdc.gov/steadi/patient.html

## 2024-04-13 RX ADMIN — Medication 975 MILLIGRAM(S): at 22:26

## 2024-04-13 RX ADMIN — Medication 600 MILLIGRAM(S): at 18:53

## 2024-04-13 RX ADMIN — Medication 1300 MILLIGRAM(S): at 15:14

## 2024-04-13 RX ADMIN — Medication 600 MILLIGRAM(S): at 07:41

## 2024-04-13 RX ADMIN — Medication 600 MILLIGRAM(S): at 18:23

## 2024-04-13 RX ADMIN — Medication 600 MILLIGRAM(S): at 12:37

## 2024-04-13 RX ADMIN — Medication 975 MILLIGRAM(S): at 23:30

## 2024-04-13 RX ADMIN — ENOXAPARIN SODIUM 40 MILLIGRAM(S): 100 INJECTION SUBCUTANEOUS at 16:18

## 2024-04-13 RX ADMIN — Medication 975 MILLIGRAM(S): at 16:45

## 2024-04-13 RX ADMIN — Medication 600 MILLIGRAM(S): at 12:07

## 2024-04-13 RX ADMIN — Medication 975 MILLIGRAM(S): at 09:00

## 2024-04-13 RX ADMIN — Medication 975 MILLIGRAM(S): at 09:30

## 2024-04-13 RX ADMIN — Medication 975 MILLIGRAM(S): at 03:02

## 2024-04-13 RX ADMIN — Medication 1 TABLET(S): at 12:07

## 2024-04-13 NOTE — PROGRESS NOTE ADULT - SUBJECTIVE AND OBJECTIVE BOX
Patient evaluated at bedside this morning, resting comfortable in bed.   She reports pain is well controlled with oral pain medications.   She denies headache, dizziness, chest pain, shortness of breath, vomiting or heavy vaginal bleeding.  She has been ambulating without assistance, voiding spontaneously, passing gas, tolerating regular diet.     Physical Exam:  Vital Signs Last 24 Hrs  T(C): 36.7 (13 Apr 2024 06:04), Max: 36.8 (12 Apr 2024 09:20)  T(F): 98.1 (13 Apr 2024 06:04), Max: 98.3 (12 Apr 2024 09:20)  HR: 57 (13 Apr 2024 06:04) (57 - 78)  BP: 102/65 (13 Apr 2024 06:04) (102/65 - 105/70)  BP(mean): --  RR: 16 (13 Apr 2024 06:04) (16 - 16)  SpO2: 95% (13 Apr 2024 06:04) (95% - 95%)    Parameters below as of 12 Apr 2024 09:20  Patient On (Oxygen Delivery Method): room air        GA: NAD, A+0 x 3  Pulm: no increased WOB  Abd: soft, nontender, mildly distended, no rebound or guarding, incision clean, dry and intact, uterus firm at midline, 2 fb below umbilicus  Extremities: mild lower extremity edema, no calf tenderness

## 2024-04-13 NOTE — PROGRESS NOTE ADULT - ATTENDING COMMENTS
Agree with the above, patient seen and examined by me. S/p pLTCS, POD#3, AFVSS and meeting postop and postpartum milestones. D/c home today with close outpatient f/u in 1 week.    Sally Peguero MD

## 2024-04-14 VITALS
TEMPERATURE: 98 F | RESPIRATION RATE: 18 BRPM | SYSTOLIC BLOOD PRESSURE: 126 MMHG | OXYGEN SATURATION: 95 % | DIASTOLIC BLOOD PRESSURE: 79 MMHG | HEART RATE: 63 BPM

## 2024-04-14 PROCEDURE — 86901 BLOOD TYPING SEROLOGIC RH(D): CPT

## 2024-04-14 PROCEDURE — 86850 RBC ANTIBODY SCREEN: CPT

## 2024-04-14 PROCEDURE — 85025 COMPLETE CBC W/AUTO DIFF WBC: CPT

## 2024-04-14 PROCEDURE — 88307 TISSUE EXAM BY PATHOLOGIST: CPT

## 2024-04-14 PROCEDURE — 86900 BLOOD TYPING SEROLOGIC ABO: CPT

## 2024-04-14 PROCEDURE — C1889: CPT

## 2024-04-14 PROCEDURE — 86780 TREPONEMA PALLIDUM: CPT

## 2024-04-14 PROCEDURE — 36415 COLL VENOUS BLD VENIPUNCTURE: CPT

## 2024-04-14 RX ADMIN — Medication 1 TABLET(S): at 12:35

## 2024-04-14 RX ADMIN — Medication 975 MILLIGRAM(S): at 02:04

## 2024-04-14 RX ADMIN — SIMETHICONE 80 MILLIGRAM(S): 80 TABLET, CHEWABLE ORAL at 00:35

## 2024-04-14 RX ADMIN — Medication 600 MILLIGRAM(S): at 12:35

## 2024-04-14 RX ADMIN — Medication 600 MILLIGRAM(S): at 00:35

## 2024-04-14 RX ADMIN — Medication 600 MILLIGRAM(S): at 05:57

## 2024-04-14 RX ADMIN — Medication 975 MILLIGRAM(S): at 09:23

## 2024-04-14 NOTE — PROGRESS NOTE ADULT - ASSESSMENT
33y Female POD#4 s/p C/S for NRFHT, Uncomplicated                                     - Neuro/Pain: motrin atc, tylenol atc, oxy prn  - CV:  VS per routine  - Pulm: Encourage ISS & Ambulation  - GI: Advance as tolerated  - : Voiding spontaneously  - DVT ppx: SCDs, Lovenox 40mg QD  - Dispo: POD #4
A/P: 33y s/p  section for NRFHT while pushing, POD#2, stable  -  Pain: PO motrin q6hrs, tylenol q8hrs, oxycodone for severe pain PRN  -  Post-operatively labs: hemodynamically stable, no symptoms of anemia   -  GI: tolerating regular diet, passing gas  -  : s/p herrera , urinating without difficulty  -  DVT prophylaxis: encouraged increased ambulation, SCDs, SQL  -  Dispo: POD 3 or 4
A/P: 33y s/p  section for arrest of dilation and NRFHT, POD#3, stable  -  Pain: PO motrin q6hrs, tylenol q8hrs, oxycodone for severe pain PRN  -  Post-operatively labs: hemodynamically stable, no symptoms of anemia   -  GI: tolerating regular diet, passing gas  -  : s/p herrera , urinating without difficulty  -  DVT prophylaxis: encouraged increased ambulation, SCDs, SQL  -  Dispo: POD 3 or 4
A/P: 33y s/p  section, POD#1, stable  -  Pain: PO motrin q6hrs, tylenol q8hrs, oxycodone for severe pain PRN  -  Post-operatively labs: hemodynamically stable, no symptoms of anemia   -  GI: tolerating regular diet, passing gas  -  : s/p herrera , urinating without difficulty  -  DVT prophylaxis: encouraged increased ambulation, SCDs, SQL  -  Dispo: POD 3 or 4

## 2024-04-14 NOTE — PROGRESS NOTE ADULT - ATTENDING COMMENTS
Agree with the above, seen and examined by me. Patient stayed d/t baby's bilirubin. Meeting all postop and postpartum milestones. D/c home today and close outpatient f/u.    Sally Peguero MD

## 2024-04-14 NOTE — PROGRESS NOTE ADULT - SUBJECTIVE AND OBJECTIVE BOX
Patient evaluated at bedside. Feels ready to go home today.   She reports pain is well controlled with OPM.  She has been ambulating without assistance, voiding spontaneously, passing gas, tolerating regular diet.  She denies HA, dizziness, CP, palpitations, SOB, n/v, or heavy vaginal bleeding.    Physical Exam:  Vital Signs Last 24 Hrs  T(C): 36.6 (14 Apr 2024 06:02), Max: 36.9 (13 Apr 2024 18:24)  T(F): 97.9 (14 Apr 2024 06:02), Max: 98.4 (13 Apr 2024 18:24)  HR: 63 (14 Apr 2024 06:02) (61 - 63)  BP: 126/79 (14 Apr 2024 06:02) (114/73 - 136/75)  RR: 18 (14 Apr 2024 06:02) (18 - 18)  SpO2: 95% (14 Apr 2024 06:02) (95% - 98%)    Parameters below as of 13 Apr 2024 10:00  Patient On (Oxygen Delivery Method): room air        Gen: NAD  Abd: + BS, soft, nontender, nondistended, no rebound or guarding  Incision clean, dry and intact  uterus firm at midline  : lochia WNL  Extremities: no swelling or calf tenderness

## 2024-04-15 LAB
ALBUMIN SERPL ELPH-MCNC: 3.4 G/DL
ALP BLD-CCNC: 179 U/L
ALT SERPL-CCNC: 14 U/L
ANION GAP SERPL CALC-SCNC: 11 MMOL/L
AST SERPL-CCNC: 22 U/L
BILE AC SER-MCNC: 5.4 UMOL/L
BILIRUB SERPL-MCNC: 0.6 MG/DL
BUN SERPL-MCNC: 8 MG/DL
CALCIUM SERPL-MCNC: 9.2 MG/DL
CHLORIDE SERPL-SCNC: 106 MMOL/L
CO2 SERPL-SCNC: 20 MMOL/L
CREAT SERPL-MCNC: 0.92 MG/DL
EGFR: 85 ML/MIN/1.73M2
GLUCOSE SERPL-MCNC: 96 MG/DL
POTASSIUM SERPL-SCNC: 4.4 MMOL/L
PROT SERPL-MCNC: 5.9 G/DL
SODIUM SERPL-SCNC: 136 MMOL/L

## 2024-04-16 LAB — SURGICAL PATHOLOGY STUDY: SIGNIFICANT CHANGE UP

## 2024-04-17 PROBLEM — Z98.890 OTHER SPECIFIED POSTPROCEDURAL STATES: Chronic | Status: ACTIVE | Noted: 2024-04-06

## 2024-04-22 DIAGNOSIS — Z34.03 ENCOUNTER FOR SUPERVISION OF NORMAL FIRST PREGNANCY, THIRD TRIMESTER: ICD-10-CM

## 2024-04-22 DIAGNOSIS — Z28.09 IMMUNIZATION NOT CARRIED OUT BECAUSE OF OTHER CONTRAINDICATION: ICD-10-CM

## 2024-04-22 DIAGNOSIS — O42.92 FULL-TERM PREMATURE RUPTURE OF MEMBRANES, UNSPECIFIED AS TO LENGTH OF TIME BETWEEN RUPTURE AND ONSET OF LABOR: ICD-10-CM

## 2024-04-22 DIAGNOSIS — O32.4XX0 MATERNAL CARE FOR HIGH HEAD AT TERM, NOT APPLICABLE OR UNSPECIFIED: ICD-10-CM

## 2024-04-22 DIAGNOSIS — Z3A.39 39 WEEKS GESTATION OF PREGNANCY: ICD-10-CM

## 2024-04-24 ENCOUNTER — APPOINTMENT (OUTPATIENT)
Dept: OBGYN | Facility: CLINIC | Age: 34
End: 2024-04-24

## 2024-04-24 VITALS
BODY MASS INDEX: 27.6 KG/M2 | DIASTOLIC BLOOD PRESSURE: 76 MMHG | HEIGHT: 62 IN | HEART RATE: 90 BPM | SYSTOLIC BLOOD PRESSURE: 112 MMHG | OXYGEN SATURATION: 98 % | WEIGHT: 150 LBS

## 2024-04-24 PROCEDURE — 0503F POSTPARTUM CARE VISIT: CPT

## 2024-04-24 NOTE — HISTORY OF PRESENT ILLNESS
[Postpartum Follow Up] : postpartum follow up [Delivery Date: ___] : on [unfilled] [Primary C/S] : delivered by  section [Male] : Delivery History: baby boy [Wt. ___] : weighing [unfilled] [Breastfeeding] : currently nursing [BF with Difficulty] : nursing without difficulty [None] : No associated symptoms are reported

## 2024-05-22 ENCOUNTER — APPOINTMENT (OUTPATIENT)
Dept: OBGYN | Facility: CLINIC | Age: 34
End: 2024-05-22

## 2024-05-22 VITALS
WEIGHT: 143 LBS | OXYGEN SATURATION: 96 % | DIASTOLIC BLOOD PRESSURE: 72 MMHG | SYSTOLIC BLOOD PRESSURE: 103 MMHG | HEIGHT: 62 IN | HEART RATE: 95 BPM | BODY MASS INDEX: 26.31 KG/M2

## 2024-05-22 DIAGNOSIS — N91.0 PRIMARY AMENORRHEA: ICD-10-CM

## 2024-05-22 DIAGNOSIS — Z34.03 ENCOUNTER FOR SUPERVISION OF NORMAL FIRST PREGNANCY, THIRD TRIMESTER: ICD-10-CM

## 2024-05-22 DIAGNOSIS — Z36.89 ENCOUNTER FOR OTHER SPECIFIED ANTENATAL SCREENING: ICD-10-CM

## 2024-05-22 DIAGNOSIS — O21.9 VOMITING OF PREGNANCY, UNSPECIFIED: ICD-10-CM

## 2024-05-22 DIAGNOSIS — B37.9 CANDIDIASIS, UNSPECIFIED: ICD-10-CM

## 2024-05-22 DIAGNOSIS — Z30.019 ENCOUNTER FOR INITIAL PRESCRIPTION OF CONTRACEPTIVES, UNSPECIFIED: ICD-10-CM

## 2024-05-22 DIAGNOSIS — N89.8 OTHER SPECIFIED NONINFLAMMATORY DISORDERS OF VAGINA: ICD-10-CM

## 2024-05-22 DIAGNOSIS — Z34.92 ENCOUNTER FOR SUPERVISION OF NORMAL PREGNANCY, UNSPECIFIED, SECOND TRIMESTER: ICD-10-CM

## 2024-05-22 DIAGNOSIS — Z34.01 ENCOUNTER FOR SUPERVISION OF NORMAL FIRST PREGNANCY, FIRST TRIMESTER: ICD-10-CM

## 2024-05-22 PROCEDURE — 0503F POSTPARTUM CARE VISIT: CPT

## 2024-05-22 RX ORDER — NORETHINDRONE 0.35 MG/1
0.35 TABLET ORAL
Qty: 1 | Refills: 11 | Status: ACTIVE | COMMUNITY
Start: 2024-05-22 | End: 1900-01-01

## 2024-05-22 NOTE — HISTORY OF PRESENT ILLNESS
[Complications:___] : complications include: [unfilled] [Delivery Date: ___] : on [unfilled] [Primary C/S] : delivered by  section [Male] : Delivery History: baby boy [Wt. ___] : weighing [unfilled] [Breastfeeding] : currently nursing [BF with Difficulty] : nursing with difficulty [Intended Contraception] : Intended Contraception: [Currently Experiencing] : The patient is currently experiencing symptoms. [Back Pain] : back pain [Breast Pain] : breast pain [BreastFeeding Problems] : breastfeeding problems [Cracked Nipples] : cracked nipples [Episiotomy Site Pain] : episiotomy site pain [Incisional Drainage] : incisional drainage [Incisional Pain] : incisional pain [Vaginal Discharge] : vaginal discharge [Chills] : chills [Fatigue] : fatigue [Fever] : fever [Resumed Menses] : has not resumed her menses [Resumed [place]] : has not resumed intercourse [Abdominal Pain] : no abdominal pain [Chest Pain] : no chest pain [S/Sx PP Depression] : no signs/symptoms of postpartum depression [Heavy Bleeding] : no heavy bleeding [Irregular Bleeding] : no irregular bleeding [Leg Pain] : no leg pain [Shortness of Breath] : no shortness of breath [Suicidal Ideation] : no suicidal ideation [Dysuria] : no dysuria [Headache] : no headache [Nausea] : no nausea [Vomiting] : no vomiting

## 2024-06-03 DIAGNOSIS — B96.89 ACUTE VAGINITIS: ICD-10-CM

## 2024-06-03 DIAGNOSIS — N76.0 ACUTE VAGINITIS: ICD-10-CM

## 2024-06-03 LAB
CANDIDA VAG CYTO: NOT DETECTED
G VAGINALIS+PREV SP MTYP VAG QL MICRO: DETECTED
T VAGINALIS VAG QL WET PREP: NOT DETECTED

## 2024-06-03 RX ORDER — METRONIDAZOLE 500 MG/1
500 TABLET ORAL
Qty: 14 | Refills: 0 | Status: ACTIVE | COMMUNITY
Start: 2024-06-03 | End: 1900-01-01

## 2024-08-02 ENCOUNTER — RX RENEWAL (OUTPATIENT)
Age: 34
End: 2024-08-02

## 2024-08-08 ENCOUNTER — RX RENEWAL (OUTPATIENT)
Age: 34
End: 2024-08-08

## 2024-08-10 PROBLEM — Z09 POSTOP CHECK: Status: ACTIVE | Noted: 2024-08-10

## 2024-08-27 RX ORDER — PRENATAL 56/IRON/FOLIC AC/DHA 35-5-1 MG
35-5-1-200 CAPSULE ORAL
Qty: 90 | Refills: 3 | Status: ACTIVE | COMMUNITY
Start: 2024-08-27 | End: 1900-01-01

## 2024-09-23 ENCOUNTER — NON-APPOINTMENT (OUTPATIENT)
Age: 34
End: 2024-09-23

## 2024-09-24 ENCOUNTER — APPOINTMENT (OUTPATIENT)
Dept: OBGYN | Facility: CLINIC | Age: 34
End: 2024-09-24
Payer: COMMERCIAL

## 2024-09-24 VITALS
DIASTOLIC BLOOD PRESSURE: 75 MMHG | SYSTOLIC BLOOD PRESSURE: 117 MMHG | HEART RATE: 104 BPM | OXYGEN SATURATION: 98 % | HEIGHT: 62 IN | BODY MASS INDEX: 26.59 KG/M2 | WEIGHT: 144.5 LBS

## 2024-09-24 DIAGNOSIS — Z30.41 ENCOUNTER FOR SURVEILLANCE OF CONTRACEPTIVE PILLS: ICD-10-CM

## 2024-09-24 DIAGNOSIS — O09.00 SUPERVISION OF PREGNANCY WITH HISTORY OF INFERTILITY, UNSPECIFIED TRIMESTER: ICD-10-CM

## 2024-09-24 DIAGNOSIS — Z30.40 ENCOUNTER FOR SURVEILLANCE OF CONTRACEPTIVES, UNSPECIFIED: ICD-10-CM

## 2024-09-24 DIAGNOSIS — N92.1 EXCESSIVE AND FREQUENT MENSTRUATION WITH IRREGULAR CYCLE: ICD-10-CM

## 2024-09-24 DIAGNOSIS — Z32.00 ENCOUNTER FOR PREGNANCY TEST, RESULT UNKNOWN: ICD-10-CM

## 2024-09-24 DIAGNOSIS — Z30.09 ENCOUNTER FOR OTHER GENERAL COUNSELING AND ADVICE ON CONTRACEPTION: ICD-10-CM

## 2024-09-24 DIAGNOSIS — Z13.1 ENCOUNTER FOR SCREENING FOR DIABETES MELLITUS: ICD-10-CM

## 2024-09-24 DIAGNOSIS — N92.6 IRREGULAR MENSTRUATION, UNSPECIFIED: ICD-10-CM

## 2024-09-24 DIAGNOSIS — L08.9 LOCAL INFECTION OF THE SKIN AND SUBCUTANEOUS TISSUE, UNSPECIFIED: ICD-10-CM

## 2024-09-24 PROCEDURE — 99214 OFFICE O/P EST MOD 30 MIN: CPT

## 2024-09-24 RX ORDER — CEPHALEXIN 500 MG/1
500 CAPSULE ORAL
Qty: 40 | Refills: 0 | Status: ACTIVE | COMMUNITY
Start: 2024-09-24 | End: 1900-01-01

## 2024-09-29 PROBLEM — O09.00 SUPERVISION OF PREGNANCY WITH HISTORY OF INFERTILITY: Status: RESOLVED | Noted: 2023-10-15 | Resolved: 2024-09-29

## 2024-09-29 PROBLEM — N92.1 BREAKTHROUGH BLEEDING ON OCPS: Status: ACTIVE | Noted: 2024-09-29

## 2024-09-29 PROBLEM — Z30.09 CONTRACEPTIVE EDUCATION: Status: ACTIVE | Noted: 2024-09-29

## 2024-09-29 PROBLEM — Z13.1 SPECIAL SCREENING EXAMINATION FOR DIABETES MELLITUS: Status: RESOLVED | Noted: 2023-12-26 | Resolved: 2024-09-29

## 2024-09-29 PROBLEM — Z30.41 ORAL CONTRACEPTIVE PILL SURVEILLANCE: Status: ACTIVE | Noted: 2024-09-29

## 2024-09-29 PROBLEM — N92.6 IRREGULAR MENSTRUAL CYCLE: Status: ACTIVE | Noted: 2024-09-29

## 2024-09-29 PROBLEM — Z32.00 VISIT FOR CONFIRMATION OF PREGNANCY TEST RESULT WITH PHYSICAL EXAM: Status: RESOLVED | Noted: 2023-08-28 | Resolved: 2024-09-29

## 2024-09-29 PROBLEM — Z30.40 CONTRACEPTIVE USE: Status: ACTIVE | Noted: 2024-09-29

## 2024-09-29 NOTE — PLAN
[FreeTextEntry1] : Here for discussion regarding birth control and for management of contraceptive options. She has been on norethindrone while she is breast feeding but it isn't working for her. She has been having intermenstrual bleeding and breakthrough bleeding, especially because she has not been good about consistently taking the pills. R/B/A of all forms of hormonal and non-hormonal contraceptive options discussed.  No contraindications to any of the methods although discussed progesterone-only while breast feeding. She wants a LARC.  Has decided on Mirena IUD. Discussed placing in office but pt thinks it may be too painful so discussed D&C hysteroscopy and placement under anesthesia.  We discussed the risks and the benefits to office placement vs. OR placement and she has decided she wants placed under anesthesia.  Will schedule at [facility]. Small skin infection on LEFT breast.  Unable to swab for cx since crusted over. No signs of mastitis.  Rx keflex.  Signs and symptoms to watch for worsening infection. No signs of systemic infection.  Abx course and monitor.

## 2024-09-29 NOTE — PLAN
[FreeTextEntry1] : Here for discussion regarding birth control and for management of contraceptive options. She has been on norethindrone while she is breast feeding but it isn't working for her. She has been having intermenstrual bleeding and breakthrough bleeding, especially because she has not been good about consistently taking the pills. R/B/A of all forms of hormonal and non-hormonal contraceptive options discussed.  No contraindications to any of the methods although discussed progesterone-only while breast feeding. She wants a LARC.  Has decided on Mirena IUD. Discussed placing in office but pt thinks it may be too painful so discussed D&C hysteroscopy and placement under anesthesia.  We discussed the risks and the benefits to office placement vs. OR placement and she has decided she wants placed under anesthesia.  Will schedule at [facility]. Small skin infection on LEFT breast.  Unable to swab for cx since crusted over. No signs of mastitis.  Rx keflex.  Signs and symptoms to watch for worsening infection. No signs of systemic infection.  Abx course and monitor.

## 2024-09-29 NOTE — PHYSICAL EXAM
[Appropriately responsive] : appropriately responsive [Alert] : alert [No Acute Distress] : no acute distress [Oriented x3] : oriented x3 [FreeTextEntry5] : No increased work of breathing [FreeTextEntry6] : On LEFT breast small about 1 cm x1cm area that is slightly red but appears raised and is crusted over so unable to do culture.  Does not appear to affect whole breast or to be mastitis.  RIGHT breast normal.

## 2024-09-29 NOTE — HISTORY OF PRESENT ILLNESS
[LMP unknown] : LMP unknown [Y] : Patient uses contraception [Oral Contraceptive] : uses oral contraception pills [Currently Active] : currently active [Men] : men [No] : No [Yes] : Yes [FreeTextEntry1] : 34 y/oi pt presents in office for birth control consultation and management. She has been on the pill but has been forgetting multiple days to take and does not want pregnancy. Also c/o irregular periods and breakthrough bleeding on the pills. She has also noticed a small area on her left breast that she says looks like a pimple and had small amount of pus come out of but now has crusted over. Denies fever/chills/systemic signs of infection.  Not taking any meds for it.  [place] Counseling: I discussed with the patient the risks of tildrakizumab including but not limited to immunosuppression, malignancy, posterior leukoencephalopathy syndrome, and serious infections.  The patient understands that monitoring is required including a PPD at baseline and must alert us or the primary physician if symptoms of infection or other concerning signs are noted.

## 2024-09-29 NOTE — HISTORY OF PRESENT ILLNESS
[LMP unknown] : LMP unknown [Y] : Patient uses contraception [Oral Contraceptive] : uses oral contraception pills [Currently Active] : currently active [Men] : men [No] : No [Yes] : Yes [FreeTextEntry1] : 34 y/oi pt presents in office for birth control consultation and management. She has been on the pill but has been forgetting multiple days to take and does not want pregnancy. Also c/o irregular periods and breakthrough bleeding on the pills. She has also noticed a small area on her left breast that she says looks like a pimple and had small amount of pus come out of but now has crusted over. Denies fever/chills/systemic signs of infection.  Not taking any meds for it.

## 2024-11-19 ENCOUNTER — APPOINTMENT (OUTPATIENT)
Dept: OBGYN | Facility: CLINIC | Age: 34
End: 2024-11-19
Payer: COMMERCIAL

## 2024-11-19 DIAGNOSIS — Z01.818 ENCOUNTER FOR OTHER PREPROCEDURAL EXAMINATION: ICD-10-CM

## 2024-11-19 PROCEDURE — 36415 COLL VENOUS BLD VENIPUNCTURE: CPT

## 2024-11-20 LAB
ABO + RH PNL BLD: NORMAL
ALBUMIN SERPL ELPH-MCNC: 4.5 G/DL
ALP BLD-CCNC: 64 U/L
ALT SERPL-CCNC: 21 U/L
ANION GAP SERPL CALC-SCNC: 12 MMOL/L
APTT BLD: 35.8 SEC
AST SERPL-CCNC: 20 U/L
BASOPHILS # BLD AUTO: 0.04 K/UL
BASOPHILS NFR BLD AUTO: 0.7 %
BILIRUB SERPL-MCNC: 0.7 MG/DL
BUN SERPL-MCNC: 14 MG/DL
CALCIUM SERPL-MCNC: 9.6 MG/DL
CHLORIDE SERPL-SCNC: 106 MMOL/L
CO2 SERPL-SCNC: 23 MMOL/L
CREAT SERPL-MCNC: 0.64 MG/DL
EGFR: 120 ML/MIN/1.73M2
EOSINOPHIL # BLD AUTO: 0.22 K/UL
EOSINOPHIL NFR BLD AUTO: 3.8 %
GLUCOSE SERPL-MCNC: 99 MG/DL
HCG SERPL-MCNC: <1 MIU/ML
HCT VFR BLD CALC: 45.1 %
HGB BLD-MCNC: 13.9 G/DL
IMM GRANULOCYTES NFR BLD AUTO: 0.3 %
INR PPP: 0.92 RATIO
LYMPHOCYTES # BLD AUTO: 1.39 K/UL
LYMPHOCYTES NFR BLD AUTO: 24 %
MAN DIFF?: NORMAL
MCHC RBC-ENTMCNC: 28.6 PG
MCHC RBC-ENTMCNC: 30.8 G/DL
MCV RBC AUTO: 92.8 FL
MONOCYTES # BLD AUTO: 0.59 K/UL
MONOCYTES NFR BLD AUTO: 10.2 %
NEUTROPHILS # BLD AUTO: 3.54 K/UL
NEUTROPHILS NFR BLD AUTO: 61 %
PLATELET # BLD AUTO: 178 K/UL
POTASSIUM SERPL-SCNC: 5.3 MMOL/L
PROT SERPL-MCNC: 6.9 G/DL
PT BLD: 10.9 SEC
RBC # BLD: 4.86 M/UL
RBC # FLD: 14.7 %
SODIUM SERPL-SCNC: 141 MMOL/L
WBC # FLD AUTO: 5.8 K/UL

## 2024-11-21 LAB — BACTERIA UR CULT: NORMAL

## 2024-12-04 NOTE — ASU PATIENT PROFILE, ADULT - NS PREOP UNDERSTANDS INFO
Spoke to patient be NPO/NO solid foods after  12Mn,   allow to drink water till 2-3 am , dress comfortable, no lotion, jewelry, nail , Bring ID photo and insurance cards,   escort arranged,  address and telephone given/yes

## 2024-12-04 NOTE — ASU PATIENT PROFILE, ADULT - FALL HARM RISK - UNIVERSAL INTERVENTIONS
Bed in lowest position, wheels locked, appropriate side rails in place/Call bell, personal items and telephone in reach/Instruct patient to call for assistance before getting out of bed or chair/Non-slip footwear when patient is out of bed/[place] to call system/Physically safe environment - no spills, clutter or unnecessary equipment/Purposeful Proactive Rounding/Room/bathroom lighting operational, light cord in reach

## 2024-12-05 ENCOUNTER — RESULT REVIEW (OUTPATIENT)
Age: 34
End: 2024-12-05

## 2024-12-05 ENCOUNTER — OUTPATIENT (OUTPATIENT)
Dept: OUTPATIENT SERVICES | Facility: HOSPITAL | Age: 34
LOS: 1 days | Discharge: HOME | End: 2024-12-05
Payer: COMMERCIAL

## 2024-12-05 ENCOUNTER — TRANSCRIPTION ENCOUNTER (OUTPATIENT)
Age: 34
End: 2024-12-05

## 2024-12-05 VITALS
HEART RATE: 76 BPM | RESPIRATION RATE: 16 BRPM | OXYGEN SATURATION: 100 % | WEIGHT: 143.74 LBS | SYSTOLIC BLOOD PRESSURE: 104 MMHG | DIASTOLIC BLOOD PRESSURE: 67 MMHG | HEIGHT: 62 IN | TEMPERATURE: 98 F

## 2024-12-05 VITALS
OXYGEN SATURATION: 100 % | TEMPERATURE: 98 F | SYSTOLIC BLOOD PRESSURE: 105 MMHG | HEART RATE: 82 BPM | DIASTOLIC BLOOD PRESSURE: 67 MMHG | RESPIRATION RATE: 16 BRPM

## 2024-12-05 DIAGNOSIS — Z87.42 PERSONAL HISTORY OF OTHER DISEASES OF THE FEMALE GENITAL TRACT: Chronic | ICD-10-CM

## 2024-12-05 DIAGNOSIS — Z98.891 HISTORY OF UTERINE SCAR FROM PREVIOUS SURGERY: Chronic | ICD-10-CM

## 2024-12-05 PROCEDURE — 58300 INSERT INTRAUTERINE DEVICE: CPT

## 2024-12-05 PROCEDURE — 88305 TISSUE EXAM BY PATHOLOGIST: CPT | Mod: 26

## 2024-12-05 PROCEDURE — 58558 HYSTEROSCOPY BIOPSY: CPT

## 2024-12-05 DEVICE — AVETA FLEX RESECTING DEVICE 2.9MM: Type: IMPLANTABLE DEVICE | Status: FUNCTIONAL

## 2024-12-05 DEVICE — IUD MIRENA: Type: IMPLANTABLE DEVICE | Status: FUNCTIONAL

## 2024-12-05 RX ORDER — 0.9 % SODIUM CHLORIDE 0.9 %
250 INTRAVENOUS SOLUTION INTRAVENOUS
Refills: 0 | Status: DISCONTINUED | OUTPATIENT
Start: 2024-12-05 | End: 2024-12-05

## 2024-12-05 RX ORDER — ACETAMINOPHEN 500MG 500 MG/1
1000 TABLET, COATED ORAL ONCE
Refills: 0 | Status: DISCONTINUED | OUTPATIENT
Start: 2024-12-05 | End: 2024-12-05

## 2024-12-05 RX ORDER — ONDANSETRON HYDROCHLORIDE 4 MG/1
4 TABLET, FILM COATED ORAL ONCE
Refills: 0 | Status: DISCONTINUED | OUTPATIENT
Start: 2024-12-05 | End: 2024-12-05

## 2024-12-05 RX ORDER — ACETAMINOPHEN 500MG 500 MG/1
1000 TABLET, COATED ORAL ONCE
Refills: 0 | Status: COMPLETED | OUTPATIENT
Start: 2024-12-05 | End: 2024-12-05

## 2024-12-05 RX ORDER — FENTANYL 12 UG/H
25 PATCH, EXTENDED RELEASE TRANSDERMAL
Refills: 0 | Status: DISCONTINUED | OUTPATIENT
Start: 2024-12-05 | End: 2024-12-05

## 2024-12-05 RX ORDER — LEVONORGESTREL 1.5 MG/1
52 TABLET ORAL ONCE
Refills: 0 | Status: DISCONTINUED | OUTPATIENT
Start: 2024-12-05 | End: 2024-12-05

## 2024-12-05 RX ADMIN — FENTANYL 25 MICROGRAM(S): 12 PATCH, EXTENDED RELEASE TRANSDERMAL at 14:10

## 2024-12-05 RX ADMIN — FENTANYL 25 MICROGRAM(S): 12 PATCH, EXTENDED RELEASE TRANSDERMAL at 14:05

## 2024-12-05 RX ADMIN — FENTANYL 25 MICROGRAM(S): 12 PATCH, EXTENDED RELEASE TRANSDERMAL at 14:24

## 2024-12-05 RX ADMIN — ACETAMINOPHEN 500MG 1000 MILLIGRAM(S): 500 TABLET, COATED ORAL at 12:00

## 2024-12-05 NOTE — BRIEF OPERATIVE NOTE - OPERATION/FINDINGS
Straight cath 20 cc UOP. Uterus sounded to 7.5 cm. Hydrodissection into the cervical os. On examination of uterine cavity, no overt endometrial hyperplasia, polyps, or myomectomies appreciated. Some polypoid tissue in the cervix. EMC obtained with [employer] system. Curettage of endometrial tissue and of endocervical tissue performed. Mirena IUD placed without difficulty, lot [id number] Feb 2027. EBL 10 cc.  cc.  Straight cath 20 cc UOP. Uterus sounded to 7.5 cm. Hydrodissection into the cervical os. On examination of uterine cavity, no overt endometrial hyperplasia, polyps, or myomas appreciated. Some polypoid tissue in the cervix. EMC obtained with [employer] system. Curettage of endometrial tissue and of endocervical tissue performed. Mirena IUD placed without difficulty, lot [id number] Feb 2027. EBL 10 cc.  cc.

## 2024-12-05 NOTE — ASU DISCHARGE PLAN (ADULT/PEDIATRIC) - CARE PROVIDER_API CALL
Farida Harrington  Obstetrics and Gynecology  [address]  [place] [address]  Phone: [phone]  Fax: [phone]  Follow Up Time:

## 2024-12-05 NOTE — ASU DISCHARGE PLAN (ADULT/PEDIATRIC) - FINANCIAL ASSISTANCE
[facility] provides services at a reduced cost to those who are determined to be eligible through [facility]’s financial assistance program. Information regarding [facility]’s financial assistance program can be found by going to [email or web address] or by calling [phone].

## 2024-12-05 NOTE — BRIEF OPERATIVE NOTE - NSICDXBRIEFPROCEDURE_GEN_ALL_CORE_FT
PROCEDURES:  Hysteroscopy 05-Dec-2024 13:34:36  Kaitlin Perez  Dilation and curettage, uterus 05-Dec-2024 13:34:42  Kaitlin Preez  Dilation and curettage, cervix 05-Dec-2024 13:34:49  Kaitlin Perez  Insertion of Mirena IUD 05-Dec-2024 13:35:06  Kaitlin Perez

## 2024-12-05 NOTE — ASU DISCHARGE PLAN (ADULT/PEDIATRIC) - NS MD DC FALL RISK RISK
For information on Fall & Injury Prevention, visit: [email or web address] OR  [email or web address] OR  https://www.cdc.gov/steadi/patient.html

## 2024-12-23 ENCOUNTER — APPOINTMENT (OUTPATIENT)
Dept: OBGYN | Facility: CLINIC | Age: 34
End: 2024-12-23

## 2024-12-23 VITALS
BODY MASS INDEX: 25.76 KG/M2 | WEIGHT: 140 LBS | OXYGEN SATURATION: 100 % | HEIGHT: 62 IN | HEART RATE: 82 BPM | DIASTOLIC BLOOD PRESSURE: 74 MMHG | SYSTOLIC BLOOD PRESSURE: 105 MMHG

## 2024-12-23 PROCEDURE — 99213 OFFICE O/P EST LOW 20 MIN: CPT | Mod: 25

## 2024-12-23 PROCEDURE — 76830 TRANSVAGINAL US NON-OB: CPT

## 2025-03-24 PROBLEM — Z87.898 HISTORY OF ITCHING: Status: RESOLVED | Noted: 2024-04-02 | Resolved: 2025-03-24

## 2025-03-24 PROBLEM — Z30.09 CONTRACEPTIVE EDUCATION: Status: RESOLVED | Noted: 2024-09-29 | Resolved: 2025-03-24

## 2025-03-24 PROBLEM — Z30.019 ENCOUNTER FOR PRESCRIPTION OF FEMALE CONTRACEPTIVES: Status: RESOLVED | Noted: 2024-05-22 | Resolved: 2025-03-24

## 2025-03-24 PROBLEM — N92.0 INTERMENSTRUAL SPOTTING DUE TO INTRAUTERINE DEVICE (IUD): Status: ACTIVE | Noted: 2025-03-24

## 2025-03-24 PROBLEM — Z01.818 PRE-OP TESTING: Status: RESOLVED | Noted: 2024-11-19 | Resolved: 2025-03-24

## 2025-03-24 PROBLEM — Z87.59 HISTORY OF THREATENED ABORTION: Status: RESOLVED | Noted: 2023-08-28 | Resolved: 2025-03-24

## 2025-03-24 PROBLEM — Z30.41 ORAL CONTRACEPTIVE PILL SURVEILLANCE: Status: RESOLVED | Noted: 2024-09-29 | Resolved: 2025-03-24

## 2025-03-24 PROBLEM — R10.2 PELVIC PAIN: Status: ACTIVE | Noted: 2025-03-24

## 2025-03-24 PROBLEM — Z23 ENCOUNTER FOR IMMUNIZATION: Status: RESOLVED | Noted: 2023-11-06 | Resolved: 2025-03-24

## 2025-03-24 PROBLEM — N92.1 BREAKTHROUGH BLEEDING ON OCPS: Status: RESOLVED | Noted: 2024-09-29 | Resolved: 2025-03-24

## 2025-03-24 PROBLEM — Z13.79 GENETIC SCREENING: Status: RESOLVED | Noted: 2023-11-06 | Resolved: 2025-03-24

## 2025-03-24 PROBLEM — N92.6 MISSED MENSES: Status: RESOLVED | Noted: 2023-08-28 | Resolved: 2025-03-24

## 2025-03-24 PROBLEM — L08.9 INFECTION, SKIN: Status: RESOLVED | Noted: 2024-09-24 | Resolved: 2025-03-24

## 2025-03-24 PROBLEM — Z09 POSTOP CHECK: Status: RESOLVED | Noted: 2024-08-10 | Resolved: 2025-03-24

## 2025-07-22 ENCOUNTER — APPOINTMENT (OUTPATIENT)
Dept: OBGYN | Facility: CLINIC | Age: 35
End: 2025-07-22

## 2025-07-22 VITALS
WEIGHT: 124 LBS | SYSTOLIC BLOOD PRESSURE: 117 MMHG | BODY MASS INDEX: 22.82 KG/M2 | OXYGEN SATURATION: 98 % | HEART RATE: 90 BPM | HEIGHT: 62 IN | DIASTOLIC BLOOD PRESSURE: 75 MMHG

## 2025-07-22 DIAGNOSIS — N60.19 DIFFUSE CYSTIC MASTOPATHY OF UNSPECIFIED BREAST: ICD-10-CM

## 2025-07-22 DIAGNOSIS — N63.0 UNSPECIFIED LUMP IN UNSPECIFIED BREAST: ICD-10-CM

## 2025-07-22 DIAGNOSIS — N64.4 MASTODYNIA: ICD-10-CM

## 2025-09-16 ENCOUNTER — RESULT REVIEW (OUTPATIENT)
Age: 35
End: 2025-09-16

## 2025-09-16 ENCOUNTER — APPOINTMENT (OUTPATIENT)
Dept: ULTRASOUND IMAGING | Facility: CLINIC | Age: 35
End: 2025-09-16

## 2025-09-16 ENCOUNTER — APPOINTMENT (OUTPATIENT)
Dept: MAMMOGRAPHY | Facility: CLINIC | Age: 35
End: 2025-09-16
Payer: COMMERCIAL

## 2025-09-16 PROCEDURE — 76641 ULTRASOUND BREAST COMPLETE: CPT | Mod: 50

## 2025-09-16 PROCEDURE — 77066 DX MAMMO INCL CAD BI: CPT

## 2025-09-16 PROCEDURE — G0279: CPT

## 2025-09-17 ENCOUNTER — TRANSCRIPTION ENCOUNTER (OUTPATIENT)
Age: 35
End: 2025-09-17

## (undated) DEVICE — AVETA CORAL HYSTEROSCOPE 4.6MM DISP

## (undated) DEVICE — AVETA FLUID MANAGEMENT ACCESSORY

## (undated) DEVICE — TUBING AQUILEX OUTFLOW

## (undated) DEVICE — PRESSURE INFUSOR BAG 3000ML

## (undated) DEVICE — GLV 8 PROTEXIS (WHITE)

## (undated) DEVICE — VENODYNE/SCD SLEEVE CALF MEDIUM

## (undated) DEVICE — WARMING BLANKET UPPER ADULT

## (undated) DEVICE — ACCESSORY AVETA WASTE MANAGEMENT 3MM

## (undated) DEVICE — TUBING TUR 2 PRONG

## (undated) DEVICE — POSITIONER FOAM EGG CRATE ULNAR 2PCS (PINK)

## (undated) DEVICE — DRAPE TOWEL BLUE 17" X 24"

## (undated) DEVICE — SOL IRR BAG NS 0.9% 3000ML

## (undated) DEVICE — PACK D&C

## (undated) DEVICE — TUBING STRYKER ARTHROSCOPY INFLOW